# Patient Record
Sex: MALE | Race: WHITE | NOT HISPANIC OR LATINO | Employment: OTHER
[De-identification: names, ages, dates, MRNs, and addresses within clinical notes are randomized per-mention and may not be internally consistent; named-entity substitution may affect disease eponyms.]

---

## 2017-04-07 ENCOUNTER — RECORDS - HEALTHEAST (OUTPATIENT)
Dept: ADMINISTRATIVE | Facility: OTHER | Age: 22
End: 2017-04-07

## 2017-05-19 ENCOUNTER — RECORDS - HEALTHEAST (OUTPATIENT)
Dept: ADMINISTRATIVE | Facility: OTHER | Age: 22
End: 2017-05-19

## 2017-06-01 ENCOUNTER — RECORDS - HEALTHEAST (OUTPATIENT)
Dept: ADMINISTRATIVE | Facility: OTHER | Age: 22
End: 2017-06-01

## 2017-06-08 ENCOUNTER — RECORDS - HEALTHEAST (OUTPATIENT)
Dept: ADMINISTRATIVE | Facility: OTHER | Age: 22
End: 2017-06-08

## 2017-06-14 ENCOUNTER — RECORDS - HEALTHEAST (OUTPATIENT)
Dept: ADMINISTRATIVE | Facility: OTHER | Age: 22
End: 2017-06-14

## 2017-07-19 ENCOUNTER — RECORDS - HEALTHEAST (OUTPATIENT)
Dept: ADMINISTRATIVE | Facility: OTHER | Age: 22
End: 2017-07-19

## 2017-09-05 ENCOUNTER — COMMUNICATION - HEALTHEAST (OUTPATIENT)
Dept: INTERNAL MEDICINE | Facility: CLINIC | Age: 22
End: 2017-09-05

## 2017-09-18 ENCOUNTER — RECORDS - HEALTHEAST (OUTPATIENT)
Dept: ADMINISTRATIVE | Facility: OTHER | Age: 22
End: 2017-09-18

## 2017-10-18 ENCOUNTER — RECORDS - HEALTHEAST (OUTPATIENT)
Dept: ADMINISTRATIVE | Facility: OTHER | Age: 22
End: 2017-10-18

## 2018-04-04 ENCOUNTER — RECORDS - HEALTHEAST (OUTPATIENT)
Dept: ADMINISTRATIVE | Facility: OTHER | Age: 23
End: 2018-04-04

## 2018-07-18 ENCOUNTER — RECORDS - HEALTHEAST (OUTPATIENT)
Dept: ADMINISTRATIVE | Facility: OTHER | Age: 23
End: 2018-07-18

## 2019-04-11 ENCOUNTER — RECORDS - HEALTHEAST (OUTPATIENT)
Dept: ADMINISTRATIVE | Facility: OTHER | Age: 24
End: 2019-04-11

## 2020-06-09 ENCOUNTER — RECORDS - HEALTHEAST (OUTPATIENT)
Dept: ADMINISTRATIVE | Facility: OTHER | Age: 25
End: 2020-06-09

## 2021-05-25 ENCOUNTER — RECORDS - HEALTHEAST (OUTPATIENT)
Dept: ADMINISTRATIVE | Facility: CLINIC | Age: 26
End: 2021-05-25

## 2022-01-10 ENCOUNTER — TELEPHONE (OUTPATIENT)
Dept: UROLOGY | Facility: CLINIC | Age: 27
End: 2022-01-10
Payer: COMMERCIAL

## 2022-01-10 NOTE — TELEPHONE ENCOUNTER
[facility] Call Center    Phone Message    May a detailed message be left on voicemail: yes     Reason for Call: Chavo, father states that patient has autism.  Patient has a stent placed in his penis that needs surgery to be taken out and patient also has kidney stones.  Patient was being seen at [facility].  Chavo states that Dr. Casanova is also the urologist for his daughter, Ya Beasley, who just had surgery with Dr. Casanova and Chavo would like for him to care for his son also.  Please reach out to Chavo.    Action Taken: Message routed to:  [facility] ([facility] Urology    Travel Screening: Not Applicable

## 2022-01-11 ENCOUNTER — PATIENT OUTREACH (OUTPATIENT)
Dept: UROLOGY | Facility: CLINIC | Age: 27
End: 2022-01-11
Payer: COMMERCIAL

## 2022-01-11 NOTE — TELEPHONE ENCOUNTER
Chavo calls into clinic stating his son has kidney stones with ureteral stent in place. Since stent placement, dad states stent had to be replaced due to infection and delayed due to covid infection. Pt is set for definitive stone management through [facility] sometime next month. Pt dad asking if Dr. Moseley would take over renal stone cares moving forward.    Dr. Casanova consulted. Per Dr. Casanova, he would be okay with setting up a virtual consult for stone management, however due to volume of surgeries and limited OR space, it is very unlikely stone management would occur any sooner through our health care system. Dr. Casanova advises to proceed with plan already set in motion through .

## 2022-01-12 ENCOUNTER — PRE VISIT (OUTPATIENT)
Dept: UROLOGY | Facility: CLINIC | Age: 27
End: 2022-01-12
Payer: COMMERCIAL

## 2022-01-12 ENCOUNTER — PATIENT OUTREACH (OUTPATIENT)
Dept: UROLOGY | Facility: CLINIC | Age: 27
End: 2022-01-12
Payer: COMMERCIAL

## 2022-01-12 NOTE — TELEPHONE ENCOUNTER
Reason for visit: consult      Dx/Hx/Sx: kidney stones, wants to see Dr. Casanova for stone removal surgery     Records/imaging/labs/orders: images requested     At Rooming: video visit

## 2022-01-12 NOTE — TELEPHONE ENCOUNTER
Pt riki phoned with update, [facility] has changed networks in order to have better coverage for other health issuers, their urologist is no longer in network and his procedure has to be cancelled with them. Appt booked with Charlee Kirkpatrick this week for planning.

## 2022-01-12 NOTE — TELEPHONE ENCOUNTER
MEDICAL RECORDS REQUEST   [facility]  Urology Clinic  [address] [place] [address]  PHONE: [phone]  Fax: [phone]        FUTURE VISIT INFORMATION                                                   Ramy Beasley, : 1995 scheduled for future visit at [facility]    APPOINTMENT INFORMATION:    Date: 2022    Provider:  Charlee Kirkpatrick CNP    Reason for Visit/Diagnosis: stone consult    REFERRAL INFORMATION:    Referring provider:  N/A    Specialty: N/A    Referring providers clinic:  N/A    Clinic contact number:  n/a    RECORDS REQUESTED FOR VISIT                                                     NOTES  STATUS/DETAILS   OFFICE NOTE from referring provider  no   OFFICE NOTE from other specialist  yes, 2021 -- Alondra Del Real APRN, CNP   DISCHARGE SUMMARY from hospital  yes, 2021 -- [facility]   DISCHARGE REPORT from the ER  yes, 2021, 2021   OPERATIVE REPORT  yes   MEDICATION LIST  yes   LABS     URINALYSIS (UA)  yes   URINE CYTOLOGY  no   KIDNEY STONE     CT Chest ABD Pelvis  yes, 2021   IMAGING (IMAGES & REPORT)  yes, 2021, 2021, 2021, 10/30/2019 -- CT ABD Pelvis  2021 -- CT ABD Pelvis Stone   KUB  yes, 2021 -- XR Retrograde Pyelogram KUB  10/30/2019 -- XR ABD KUB     PRE-VISIT CHECKLIST      Record collection complete yes   Appointment appropriately scheduled           (right time/right provider) Yes   Joint diagnostic appointment coordinated correctly          (ensure right order & amount of time) Yes   MyChart activation No   Questionnaire complete If no, please explain pending     Action 2022 JTV 2:47pm   Action Taken [facility] sent a request to [employer] for images to be sent.     Action 2022 JTV 9:24am   Action Taken [facility] sent a request to [employer] for images to be pushed.     @12:31pm, [facility] received and resolved images from .

## 2022-01-14 ENCOUNTER — TELEPHONE (OUTPATIENT)
Dept: UROLOGY | Facility: CLINIC | Age: 27
End: 2022-01-14

## 2022-01-14 ENCOUNTER — VIRTUAL VISIT (OUTPATIENT)
Dept: UROLOGY | Facility: CLINIC | Age: 27
End: 2022-01-14
Payer: COMMERCIAL

## 2022-01-14 ENCOUNTER — PATIENT OUTREACH (OUTPATIENT)
Dept: UROLOGY | Facility: CLINIC | Age: 27
End: 2022-01-14

## 2022-01-14 DIAGNOSIS — N20.1 URETERAL STONE: Primary | ICD-10-CM

## 2022-01-14 DIAGNOSIS — N32.89 BLADDER SPASMS: ICD-10-CM

## 2022-01-14 PROCEDURE — 99203 OFFICE O/P NEW LOW 30 MIN: CPT | Mod: GT | Performed by: NURSE PRACTITIONER

## 2022-01-14 RX ORDER — CETIRIZINE HYDROCHLORIDE 10 MG/1
10 TABLET ORAL
COMMUNITY
End: 2022-03-01

## 2022-01-14 RX ORDER — AMOXICILLIN 250 MG
1 CAPSULE ORAL DAILY
COMMUNITY

## 2022-01-14 RX ORDER — RISPERIDONE 3 MG/1
3 TABLET ORAL
COMMUNITY
Start: 2021-07-27 | End: 2022-03-07

## 2022-01-14 RX ORDER — OXYBUTYNIN CHLORIDE 5 MG/1
5 TABLET ORAL 3 TIMES DAILY PRN
Qty: 60 TABLET | Refills: 1 | Status: SHIPPED | OUTPATIENT
Start: 2022-01-14 | End: 2022-03-01

## 2022-01-14 RX ORDER — MULTIVITAMIN
1 TABLET ORAL DAILY
COMMUNITY

## 2022-01-14 RX ORDER — LORATADINE 10 MG/1
10 TABLET ORAL
COMMUNITY
End: 2024-02-05 | Stop reason: ALTCHOICE

## 2022-01-14 RX ORDER — QUETIAPINE FUMARATE 300 MG/1
300 TABLET, FILM COATED ORAL 2 TIMES DAILY
COMMUNITY
Start: 2021-07-27

## 2022-01-14 NOTE — PROGRESS NOTES
Ramy is a 26 year old who is being evaluated via a billable video visit.      How would you like to obtain your AVS? Mail a copy  If the video visit is dropped, the invitation should be resent by: Send to e-mail at: [email or web address]  Will anyone else be joining your video visit? No    Video Start Time: 7:00 AM  Video-Visit Details    Type of service:  Video Visit    Video End Time: 7:18 AM    Originating Location (pt. Location): Home    Distant Location (provider location):  [facility] [place]     Platform used for Video Visit: Mandie       Ramy NAM Gale complains of   Chief Complaint   Patient presents with     Consult     stone removal       Assessment/Plan:  26 year old male with a complex PMH, including epilepsy, active autistic disorder, acute lymphoblastic leukemia in remission, mild intellectual disability, hyperlipidemia and autonomic neuropathy who was found to have a 6 mm stone at his left UPJ, now s/p stent placement by outside urologist. Due to changes to his insurance coverage needs to pursue definitive stone surgery at [facility]. We discussed ureteroscopy today in detail.   -Continue oxybutynin 5 mg TID as needed for bladder spasms related to stent. Refills sent.   -Will schedule a nurse visit in our clinic to obtain a catheterized urine culture, as the patient is incontinent at baseline.  -Patient has visit with his PCP in two weeks to establish care.   -Surgery case requested.     Charlee Kirkpatrick, CNP  Department of Urology      Subjective:   26 year old male with a history of epilepsy, active autistic disorder, acute lymphoblastic leukemia in remission, mild intellectual disability, hyperlipidemia and autonomic neuropathy who presents via video visit and is joined by his parents, Chavo and Trevor, who provide his HPI information. The patient is nonverbal. On 11/8/21, he was found to have a 6 mm stone at the left UPJ with moderate hydro, per CT. Stent placed and surgery was  planned with [facility] urologist. However, the patient developed COVID pneumonia a few weeks later and surgery was delayed. His stent was exchanged on 12/20/21.     This is his first stone. The patient does take Topamax and a taper plan has already been discussed. Mildred notes that they will begin this taper after his current stone has been dealt with and he has some time to recover from the past few months.      He has been taking oxybutynin 5 mg TID as needed for bladder spasms with his stent in place. As he is nonverbal, he cannot communicate if he is experiencing pain, but sometimes makes a face that suggests discomfort. They would like to know if he should continue this medication, and if so, request a refill.     He has a visit to establish care with his new PCP in two weeks, Dr. Ferrer at the [facility].       Objective:     Exam:   Patient is a 26 year old male   No vital signs obtained due to virtual visit.  General Appearance: Well groomed, hygenic  Respiratory: No cough, no respiratory distress or labored breathing  Skin: No discoloration or apparent rashes  Psychiatric: Alert, nonverbal  Further examination is deferred due to the nature of our visit.

## 2022-01-14 NOTE — TELEPHONE ENCOUNTER
FUTURE VISIT INFORMATION      SURGERY INFORMATION:    Date: 2022    Location: [facility] OR    Surgeon:  Nakul Casanova MD    Anesthesia Type:  General    Procedure: CYSTOURETEROSCOPY, WITH LITHOTRIPSY USING LASER AND URETERAL STENT INSERTION    Consult: 22    RECORDS REQUESTED FROM:       Primary Care Provider: Yousuf Maria DO ([facility] [[email or web address]  )    Pertinent Medical History: Acute lymphoblastic leukemia (ALL) ([facility])    Most recent EKG+ Tracin22 ([facility])

## 2022-01-14 NOTE — TELEPHONE ENCOUNTER
Urology provider requests updated urine sample prior to surgery, will potentially need to be a catheter sample due to urinary incontinence. Detailed voicemail left for dad asking return call to arrange      DEV Powell

## 2022-01-14 NOTE — LETTER
1/14/2022       RE: Ramy Beasley  [address] [address]     Dear Colleague,    Thank you for referring your patient, Ramy Beasley, to the [facility] [place] at [facility]. Please see a copy of my visit note below.    Ramy is a 26 year old who is being evaluated via a billable video visit.      How would you like to obtain your AVS? Mail a copy  If the video visit is dropped, the invitation should be resent by: Send to e-mail at: [email or web address]  Will anyone else be joining your video visit? No    Video Start Time: 7:00 AM  Video-Visit Details    Type of service:  Video Visit    Video End Time: 7:18 AM    Originating Location (pt. Location): Home    Distant Location (provider location):  [facility] [place]     Platform used for Video Visit: AmWell       Ramy Beasley complains of   Chief Complaint   Patient presents with     Consult     stone removal       Assessment/Plan:  26 year old male with a complex PMH, including epilepsy, active autistic disorder, acute lymphoblastic leukemia in remission, mild intellectual disability, hyperlipidemia and autonomic neuropathy who was found to have a 6 mm stone at his left UPJ, now s/p stent placement by outside urologist. Due to changes to his insurance coverage needs to pursue definitive stone surgery at [facility]. We discussed ureteroscopy today in detail.   -Continue oxybutynin 5 mg TID as needed for bladder spasms related to stent. Refills sent.   -Will schedule a nurse visit in our clinic to obtain a catheterized urine culture, as the patient is incontinent at baseline.  -Patient has visit with his PCP in two weeks to establish care.   -Surgery case requested.     Charlee Kirkpatrick, CNP  Department of Urology      Subjective:   26 year old male with a history of epilepsy, active autistic disorder, acute lymphoblastic leukemia in remission, mild  intellectual disability, hyperlipidemia and autonomic neuropathy who presents via video visit and is joined by his parents, Chavo and Mildred, who provide his HPI information. The patient is nonverbal. On 11/8/21, he was found to have a 6 mm stone at the left UPJ with moderate hydro, per CT. Stent placed and surgery was planned with [facility] urologist. However, the patient developed COVID pneumonia a few weeks later and surgery was delayed. His stent was exchanged on 12/20/21.     This is his first stone. The patient does take Topamax and a taper plan has already been discussed. Mildred notes that they will begin this taper after his current stone has been dealt with and he has some time to recover from the past few months.      He has been taking oxybutynin 5 mg TID as needed for bladder spasms with his stent in place. As he is nonverbal, he cannot communicate if he is experiencing pain, but sometimes makes a face that suggests discomfort. They would like to know if he should continue this medication, and if so, request a refill.     He has a visit to establish care with his new PCP in two weeks, Dr. Ferrer at the [facility].       Objective:     Exam:   Patient is a 26 year old male   No vital signs obtained due to virtual visit.  General Appearance: Well groomed, hygenic  Respiratory: No cough, no respiratory distress or labored breathing  Skin: No discoloration or apparent rashes  Psychiatric: Alert, nonverbal  Further examination is deferred due to the nature of our visit.                 Again, thank you for allowing me to participate in the care of your patient.      Sincerely,    Charlee Kirkpatrick, CNP

## 2022-01-14 NOTE — PATIENT INSTRUCTIONS
UROLOGY CLINIC VISIT PATIENT INSTRUCTIONS    -Please follow up for a nurse visit to obtain a catheterized urine sample.   -Follow up for surgery per surgery scheduler.     If you have any issues, questions or concerns in the meantime, do not hesitate to contact us at [phone] or via [employer].       Charlee Kirkpatrick, CNP  Department of Urology

## 2022-01-14 NOTE — H&P (VIEW-ONLY)
Ramy is a 26 year old who is being evaluated via a billable video visit.      How would you like to obtain your AVS? Mail a copy  If the video visit is dropped, the invitation should be resent by: Send to e-mail at: [email or web address]  Will anyone else be joining your video visit? No    Video Start Time: 7:00 AM  Video-Visit Details    Type of service:  Video Visit    Video End Time: 7:18 AM    Originating Location (pt. Location): Home    Distant Location (provider location):  [facility] [place]     Platform used for Video Visit: Mandie       Ramy NAM Gale complains of   Chief Complaint   Patient presents with     Consult     stone removal       Assessment/Plan:  26 year old male with a complex PMH, including epilepsy, active autistic disorder, acute lymphoblastic leukemia in remission, mild intellectual disability, hyperlipidemia and autonomic neuropathy who was found to have a 6 mm stone at his left UPJ, now s/p stent placement by outside urologist. Due to changes to his insurance coverage needs to pursue definitive stone surgery at [facility]. We discussed ureteroscopy today in detail.   -Continue oxybutynin 5 mg TID as needed for bladder spasms related to stent. Refills sent.   -Will schedule a nurse visit in our clinic to obtain a catheterized urine culture, as the patient is incontinent at baseline.  -Patient has visit with his PCP in two weeks to establish care.   -Surgery case requested.     Charlee Kirkpatrick, CNP  Department of Urology      Subjective:   26 year old male with a history of epilepsy, active autistic disorder, acute lymphoblastic leukemia in remission, mild intellectual disability, hyperlipidemia and autonomic neuropathy who presents via video visit and is joined by his parents, Chavo and Trevor, who provide his HPI information. The patient is nonverbal. On 11/8/21, he was found to have a 6 mm stone at the left UPJ with moderate hydro, per CT. Stent placed and surgery was  planned with [facility] urologist. However, the patient developed COVID pneumonia a few weeks later and surgery was delayed. His stent was exchanged on 12/20/21.     This is his first stone. The patient does take Topamax and a taper plan has already been discussed. Mildred notes that they will begin this taper after his current stone has been dealt with and he has some time to recover from the past few months.      He has been taking oxybutynin 5 mg TID as needed for bladder spasms with his stent in place. As he is nonverbal, he cannot communicate if he is experiencing pain, but sometimes makes a face that suggests discomfort. They would like to know if he should continue this medication, and if so, request a refill.     He has a visit to establish care with his new PCP in two weeks, Dr. Ferrer at the [facility].       Objective:     Exam:   Patient is a 26 year old male   No vital signs obtained due to virtual visit.  General Appearance: Well groomed, hygenic  Respiratory: No cough, no respiratory distress or labored breathing  Skin: No discoloration or apparent rashes  Psychiatric: Alert, nonverbal  Further examination is deferred due to the nature of our visit.

## 2022-01-17 DIAGNOSIS — Z11.59 ENCOUNTER FOR SCREENING FOR OTHER VIRAL DISEASES: Primary | ICD-10-CM

## 2022-01-18 ENCOUNTER — OFFICE VISIT (OUTPATIENT)
Dept: UROLOGY | Facility: CLINIC | Age: 27
End: 2022-01-18
Payer: COMMERCIAL

## 2022-01-18 ENCOUNTER — PATIENT OUTREACH (OUTPATIENT)
Dept: UROLOGY | Facility: CLINIC | Age: 27
End: 2022-01-18
Payer: COMMERCIAL

## 2022-01-18 DIAGNOSIS — N20.1 URETERAL STONE: Primary | ICD-10-CM

## 2022-01-18 LAB
ALBUMIN UR-MCNC: 100 MG/DL
AMORPH CRY #/AREA URNS HPF: ABNORMAL /HPF
APPEARANCE UR: ABNORMAL
BILIRUB UR QL STRIP: NEGATIVE
COLOR UR AUTO: YELLOW
GLUCOSE UR STRIP-MCNC: NEGATIVE MG/DL
HGB UR QL STRIP: NEGATIVE
KETONES UR STRIP-MCNC: NEGATIVE MG/DL
LEUKOCYTE ESTERASE UR QL STRIP: ABNORMAL
MUCOUS THREADS #/AREA URNS LPF: PRESENT /LPF
NITRATE UR QL: NEGATIVE
PH UR STRIP: 6 [PH] (ref 5–7)
RBC URINE: 22 /HPF
SP GR UR STRIP: 1.02 (ref 1–1.03)
TRANSITIONAL EPI: <1 /HPF
UROBILINOGEN UR STRIP-MCNC: NORMAL MG/DL
WBC URINE: 26 /HPF

## 2022-01-18 PROCEDURE — 81001 URINALYSIS AUTO W/SCOPE: CPT | Performed by: PATHOLOGY

## 2022-01-18 PROCEDURE — 87086 URINE CULTURE/COLONY COUNT: CPT | Mod: 90 | Performed by: PATHOLOGY

## 2022-01-18 PROCEDURE — 99000 SPECIMEN HANDLING OFFICE-LAB: CPT | Performed by: PATHOLOGY

## 2022-01-18 NOTE — CONFIDENTIAL NOTE
Patient is scheduled for surgery with Dr. Casanova    Spoke with: Patient's father Chavo    Date of Surgery: Wednesday 01/26/22    Location: [facility] OR     Informed patient they will need an adult  Yes    Pre op with Provider brandon    H&P: Scheduled with PAC video visit Thursday 01/20/22    Pre-procedure COVID-19 Test: Patient tested positive for covid on 11/24/22    Additional imaging/appointments: na    Surgery packet: optifrieght to patient 01/17/22, verified address on file is current and correct.      Additional comments: ok for [facility] per Dhruv Moy [facility] CRNA 01/14/22

## 2022-01-18 NOTE — TELEPHONE ENCOUNTER
Pre Op Teaching Flowsheet       Pre and Post op Patient Education  Relevant Diagnosis:  stone  Surgical procedure:  Laser litho  Teaching Topic:  Pre and post op teaching  Person Involved in teaching: Yes    Motivation Level:  Asks Questions: Yes  Eager to Learn: Yes  Cooperative: Yes  Receptive (willing/able to accept information):  Yes    Patient demonstrates understanding of the following:  Date of surgery:  1/26  Location of surgery:  [facility] - 5th Floor  History and Physical and any other testing necessary prior to surgery: Yes  Required time line for completion of History and Physical and any pre-op testing: Yes    Patient demonstrates understanding of the following:  Pre-op bowel prep:  N/A  Pre-op showering/scrub information with PCMX Soap: Yes  Blood thinner medications discussed and when to stop (if applicable):  Yes  Discussed no visitor's at this time due to increase Covid-19 cases and how we need to make sure everyone stays safe.    Infection Prevention:   Patient demonstrates understanding of the following:  Surgical procedure site care taught: Yes  Signs and symptoms of infection taught: Yes      Post-op follow-up:  Discussed how to contact the hospital, nurse, and clinic scheduling staff if necessary. (See packet information)    Instructional materials used/given/mailed:  [place] Surgery Packet, post op teaching sheet, Map, Soap, and with the arrival/location information to come closer to the surgery date.    Surgical instructions packet given to patient in office:  N/A    Follow up: Discussed arranging for someone to drive you home. ( No public transportation)  Someone needed to stay the first twenty hours after surgery: Yes     referral: no     home:  yes    Care Giver:  yes    PCP:  yes

## 2022-01-19 ENCOUNTER — NURSE TRIAGE (OUTPATIENT)
Dept: NURSING | Facility: CLINIC | Age: 27
End: 2022-01-19

## 2022-01-19 NOTE — TELEPHONE ENCOUNTER
"Caller is pt's father (Chavo).  Father reports being pt's father as well as guardian.  Pt is autistic with multiple other co-morbidities.    Had covid during November 2021.  \"Hospitalized twice over the past month ([facility]) for lethargy resulting from covid.\"  Pt is considered a covid \"long-hauler.\"  Multiple co-morbidities -> Epilepsy, kidney stones, anemia, necrosis of joints.    Last evening \"Took Ramy to [facility] for dehydration\".  \"He sleeps 20 hours a day.\"  \"He was given a liter of fluids and then was 'happier'\"  No fevers or other vital sign changes.    Today \"lethargic again.\"  \"Did have a wet diaper this morning upon waking -> 7 hours ago.\"    Parents \"need help at home.\"  \"Hematologist refused to order home care.\"  Parents are seeking home care orders.  Father states \"We can't just keep taking him to ERs all the time.\"    Conducted thorough triage now to assess pt's stability due to father's report of past 'dehydration.'  Pt is now able to be awakened, exhibits alertness and is now drinking Pedialyte per father's report.  Currently stable.  Appt currently scheduled for next week to establish care.  However father and mother request clinical eval sooner to address the issue of pursuing home care assistance.  Transferred to a  for appointment.    [facility] Nurse Advisor     Reason for Disposition    Patient wants to be seen     (parents and guardian)    Additional Information    Negative: Difficult to awaken or acting confused (e.g., disoriented, slurred speech)    Negative: New neurologic deficit that is present NOW, sudden onset of ANY of the following: * Weakness of the face, arm, or leg on one side of the body* Numbness of the face, arm, or leg on one side of the body* Loss of speech or garbled speech    Negative: Sounds like a life-threatening emergency to the triager    Negative: Confusion, disorientation, or hallucinations is the main symptom    Negative: " Dizziness is the main symptom    Negative: Followed a head injury within last 3 days    Negative: Headache (with neurologic deficit)    Negative: Unable to urinate (or only a few drops) and bladder feels very full    Negative: Loss of control of bowel or bladder (i.e., incontinence) of new onset    Negative: Back pain with numbness (loss of sensation) in groin or rectal area    Negative: Patient sounds very sick or weak to the triager    Negative: Neurologic deficit that was brief (now gone), ANY of the following: * Weakness of the face, arm, or leg on one side of the body * Numbness of the face, arm, or leg on one side of the body * Loss of speech or garbled speech    Negative: Neurologic deficit of gradual onset, ANY of the following: * Weakness of the face, arm, or leg on one side of the body * Numbness of the face, arm, or leg on one side of the body * Loss of speech or garbled speech    Negative: [place] palsy suspected (i.e., weakness only one side of the face, developing over hours to days, no other symptoms)    Negative: Tingling (e.g., pins and needles) of the face, arm or leg on one side of the body, that is  present now (Exceptions: chronic/recurrent symptom lasting > 4 weeks or tingling from known cause, such as: bumped elbow, carpal tunnel syndrome, pinched nerve, frostbite)    Negative: Neck pain (with neurologic deficit)    Negative: Back pain (with neurologic deficit)    Protocols used: NEUROLOGIC DEFICIT-A-OH    __________________    COVID 19 Nurse Triage Plan/Patient Instructions    Please be aware that novel coronavirus (COVID-19) may be circulating in the community. If you develop symptoms such as fever, cough, or SOB or if you have concerns about the presence of another infection including coronavirus (COVID-19), please contact your health care provider or visit [email or web address].     Disposition/Instructions    Additional COVID19 information to add for patients.   How can I protect  "others?  If you have symptoms (fever, cough, body aches or trouble breathing): Stay home and away from others (self-isolate) until:    At least 10 days have passed since your symptoms started, And     You ve had no fever--and no medicine that reduces fever--for 1 full day (24 hours), And      Your other symptoms have resolved (gotten better).     If you don t have symptoms, but a test showed that you have COVID-19 (you tested positive):    Stay home and away from others (self-isolate). Follow the tips under \"How do I self-isolate?\" below for 10 days (20 days if you have a weak immune system).    You don't need to be retested for COVID-19 before going back to school or work. As long as you're fever-free and feeling better, you can go back to school, work and other activities after waiting the 10 or 20 days.     How do I self-isolate?    Stay in your own room, even for meals. Use your own bathroom if you can.     Stay away from others in your home. No hugging, kissing or shaking hands. No visitors.    Don t go to work, school or anywhere else.     Clean  high touch  surfaces often (doorknobs, counters, handles, etc.). Use a household cleaning spray or wipes. You ll find a full list on the EPA website:  www.epa.gov/pesticide-registration/list-n-disinfectants-use-against-sars-cov-2.    Cover your mouth and nose with a mask, tissue or washcloth to avoid spreading germs.    Wash your hands and face often. Use soap and water.    Caregivers in these groups are at risk for severe illness due to COVID-19:  o People 65 years and older  o People who live in a nursing home or long-term care facility  o People with chronic disease (lung, heart, cancer, diabetes, kidney, liver, immunologic)  o People who have a weakened immune system, including those who:  - Are in cancer treatment  - Take medicine that weakens the immune system, such as corticosteroids  - Had a bone marrow or organ transplant  - Have an immune deficiency  - Have " poorly controlled HIV or AIDS  - Are obese (body mass index of 40 or higher)  - Smoke regularly    Caregivers should wear gloves while washing dishes, handling laundry and cleaning bedrooms and bathrooms.    Use caution when washing and drying laundry: Don t shake dirty laundry, and use the warmest water setting that you can.    For more tips, go to www.cdc.gov/coronavirus/2019-ncov/downloads/10Things.pdf.    How can I take care of myself?  1. Get lots of rest. Drink extra fluids (unless a doctor has told you not to).     2. Take Tylenol (acetaminophen) for fever or pain. If you have liver or kidney problems, ask your family doctor if it s okay to take Tylenol.     Adults can take either:     650 mg (two 325 mg pills) every 4 to 6 hours, or     1,000 mg (two 500 mg pills) every 8 hours as needed.     Note: Don t take more than 3,000 mg in one day.   Acetaminophen is found in many medicines (both prescribed and over-the-counter medicines). Read all labels to be sure you don t take too much.     For children, check the Tylenol bottle for the right dose. The dose is based on the child s age or weight.    3. If you have other health problems (like cancer, heart failure, an organ transplant or severe kidney disease): Call your specialty clinic if you don t feel better in the next 2 days.    4. Know when to call 911: Emergency warning signs include:    Trouble breathing or shortness of breath    Pain or pressure in the chest that doesn t go away    Feeling confused like you haven t felt before, or not being able to wake up    Bluish-colored lips or face    What are the symptoms of COVID-19?     The most common symptoms are cough, fever and trouble breathing.     Less common symptoms include body aches, chills, diarrhea (loose, watery poops), fatigue (feeling very tired), headache, runny nose, sore throat and loss of smell.    COVID-19 can cause severe coughing (bronchitis) and lung infection (pneumonia).    How does it  spread?     The virus may spread when a person coughs or sneezes into the air. The virus can travel about 6 feet this way, and it can live on surfaces.      Common  (household disinfectants) will kill the virus.    Who is at risk?  Anyone can catch COVID-19 if they re around someone who has the virus.    How can others protect themselves?     Stay away from people who have COVID-19 (or symptoms of COVID-19).    Wash hands often with soap and water. Or, use hand  with at least 60% alcohol.    Avoid touching the eyes, nose or mouth.     Wear a face mask when you go out in public, when sick or when caring for a sick person.    Where can I get more information?     "[employer] [place]: About COVID-19: [email or web address]    CDC: What to Do If You re Sick: www.cdc.gov/coronavirus/2019-ncov/about/steps-when-sick.html    CDC: Ending Home Isolation: www.cdc.gov/coronavirus/2019-ncov/hcp/disposition-in-home-patients.html     CDC: Caring for Someone: www.cdc.gov/coronavirus/2019-ncov/if-you-are-sick/care-for-someone.html     [facility]: Interim Guidance for Hospital Discharge to Home: www.health.[facility]/diseases/coronavirus/hcp/hospdischarge.pdf    [facility] clinical trials (COVID-19 research studies): clinicalaffairs.[facility]-clinical-trials     Below are the COVID-19 hotlines at the [place] Department of Health ([facility]). Interpreters are available.   o For health questions: Call 081-326-4320 or 1-625.622.8123 (7 a.m. to 7 p.m.)  o For questions about schools and childcare: Call 317-020-6789 or 1-174.622.6684 (7 a.m. to 7 p.m.)          Thank you for taking steps to prevent the spread of this virus.  o Limit your contact with others.  o Wear a simple mask to cover your cough.  o Wash your hands well and often.    Resources    M "[employer] [place]: About COVID-19: [email or web address]    CDC: What to Do If You're Sick:  www.cdc.gov/coronavirus/2019-ncov/about/steps-when-sick.html    CDC: Ending Home Isolation: www.cdc.gov/coronavirus/2019-ncov/hcp/disposition-in-home-patients.html     CDC: Caring for Someone: www.cdc.gov/coronavirus/2019-ncov/if-you-are-sick/care-for-someone.html     [facility]: Interim Guidance for Hospital Discharge to Home: www.[facility].mn./diseases/coronavirus/hcp/hospdischarge.pdf    [facility] clinical trials (COVID-19 research studies): clinicalaffairs.[facility]-clinical-trials     Below are the COVID-19 hotlines at the [place] Department of Health ([facility]). Interpreters are available.   o For health questions: Call 647-669-9300 or 1-468.602.6338 (7 a.m. to 7 p.m.)  o For questions about schools and childcare: Call 394-071-4521 or 1-145.877.5211 (7 a.m. to 7 p.m.)

## 2022-01-20 ENCOUNTER — PRE VISIT (OUTPATIENT)
Dept: SURGERY | Facility: CLINIC | Age: 27
End: 2022-01-20

## 2022-01-20 LAB — BACTERIA UR CULT: NO GROWTH

## 2022-01-21 ENCOUNTER — PATIENT OUTREACH (OUTPATIENT)
Dept: UROLOGY | Facility: CLINIC | Age: 27
End: 2022-01-21
Payer: COMMERCIAL

## 2022-01-21 NOTE — TELEPHONE ENCOUNTER
Pt is having g-tube placed today and dad is not sure about discharge date. Pt missed pre op with ED event, and we may be able to manage a covid test before discharge or stat order Monday, depending on timing of everything. Will wait for updates.    Pt currently at [facility]

## 2022-01-24 ENCOUNTER — PATIENT OUTREACH (OUTPATIENT)
Dept: UROLOGY | Facility: CLINIC | Age: 27
End: 2022-01-24
Payer: COMMERCIAL

## 2022-01-24 NOTE — TELEPHONE ENCOUNTER
Pt dad called and team updated- will proceed as planned on Wednesday, pt is discharging today and having covid test prior. Surgery team updated.

## 2022-01-24 NOTE — TELEPHONE ENCOUNTER
Pt was advised to have covid test done, inaccurately by urology nurse. Pt did have covid within last 90 days-should not have retested. Pt test result from 1/24 is null and okay to proceed per chris at [facility] per protocol. Staff updated.

## 2022-01-25 ENCOUNTER — ANESTHESIA EVENT (OUTPATIENT)
Dept: SURGERY | Facility: AMBULATORY SURGERY CENTER | Age: 27
End: 2022-01-25
Payer: COMMERCIAL

## 2022-01-26 ENCOUNTER — ANCILLARY PROCEDURE (OUTPATIENT)
Dept: RADIOLOGY | Facility: AMBULATORY SURGERY CENTER | Age: 27
End: 2022-01-26
Attending: UROLOGY
Payer: COMMERCIAL

## 2022-01-26 ENCOUNTER — ANESTHESIA (OUTPATIENT)
Dept: SURGERY | Facility: AMBULATORY SURGERY CENTER | Age: 27
End: 2022-01-26
Payer: COMMERCIAL

## 2022-01-26 ENCOUNTER — HOSPITAL ENCOUNTER (OUTPATIENT)
Facility: AMBULATORY SURGERY CENTER | Age: 27
End: 2022-01-26
Attending: UROLOGY
Payer: COMMERCIAL

## 2022-01-26 VITALS
WEIGHT: 150 LBS | BODY MASS INDEX: 18.65 KG/M2 | DIASTOLIC BLOOD PRESSURE: 70 MMHG | HEIGHT: 75 IN | SYSTOLIC BLOOD PRESSURE: 112 MMHG | OXYGEN SATURATION: 98 % | RESPIRATION RATE: 10 BRPM | HEART RATE: 84 BPM | TEMPERATURE: 97 F

## 2022-01-26 DIAGNOSIS — N20.1 URETERAL STONE: ICD-10-CM

## 2022-01-26 PROCEDURE — 74420 UROGRAPHY RTRGR +-KUB: CPT | Mod: 26 | Performed by: UROLOGY

## 2022-01-26 PROCEDURE — 87070 CULTURE OTHR SPECIMN AEROBIC: CPT | Mod: 90 | Performed by: PATHOLOGY

## 2022-01-26 PROCEDURE — 74420 UROGRAPHY RTRGR +-KUB: CPT | Mod: TC

## 2022-01-26 PROCEDURE — 52356 CYSTO/URETERO W/LITHOTRIPSY: CPT | Mod: LT

## 2022-01-26 PROCEDURE — 82365 CALCULUS SPECTROSCOPY: CPT | Mod: 90 | Performed by: PATHOLOGY

## 2022-01-26 PROCEDURE — 87106 FUNGI IDENTIFICATION YEAST: CPT | Mod: 90 | Performed by: PATHOLOGY

## 2022-01-26 PROCEDURE — 52356 CYSTO/URETERO W/LITHOTRIPSY: CPT | Mod: LT | Performed by: UROLOGY

## 2022-01-26 PROCEDURE — 87205 SMEAR GRAM STAIN: CPT | Mod: 90 | Performed by: PATHOLOGY

## 2022-01-26 PROCEDURE — 99000 SPECIMEN HANDLING OFFICE-LAB: CPT | Performed by: PATHOLOGY

## 2022-01-26 DEVICE — IMPLANTABLE DEVICE: Type: IMPLANTABLE DEVICE | Site: URETER | Status: FUNCTIONAL

## 2022-01-26 RX ORDER — GABAPENTIN 300 MG/1
300 CAPSULE ORAL
Status: DISCONTINUED | OUTPATIENT
Start: 2022-01-26 | End: 2022-01-26 | Stop reason: HOSPADM

## 2022-01-26 RX ORDER — LIDOCAINE HYDROCHLORIDE 20 MG/ML
INJECTION, SOLUTION INFILTRATION; PERINEURAL PRN
Status: DISCONTINUED | OUTPATIENT
Start: 2022-01-26 | End: 2022-01-26

## 2022-01-26 RX ORDER — HYDROMORPHONE HYDROCHLORIDE 1 MG/ML
0.2 INJECTION, SOLUTION INTRAMUSCULAR; INTRAVENOUS; SUBCUTANEOUS EVERY 5 MIN PRN
Status: DISCONTINUED | OUTPATIENT
Start: 2022-01-26 | End: 2022-01-26 | Stop reason: HOSPADM

## 2022-01-26 RX ORDER — SODIUM CHLORIDE, SODIUM LACTATE, POTASSIUM CHLORIDE, CALCIUM CHLORIDE 600; 310; 30; 20 MG/100ML; MG/100ML; MG/100ML; MG/100ML
INJECTION, SOLUTION INTRAVENOUS CONTINUOUS
Status: DISCONTINUED | OUTPATIENT
Start: 2022-01-26 | End: 2022-01-27 | Stop reason: HOSPADM

## 2022-01-26 RX ORDER — DEXAMETHASONE SODIUM PHOSPHATE 4 MG/ML
INJECTION, SOLUTION INTRA-ARTICULAR; INTRALESIONAL; INTRAMUSCULAR; INTRAVENOUS; SOFT TISSUE PRN
Status: DISCONTINUED | OUTPATIENT
Start: 2022-01-26 | End: 2022-01-26

## 2022-01-26 RX ORDER — ONDANSETRON 2 MG/ML
INJECTION INTRAMUSCULAR; INTRAVENOUS PRN
Status: DISCONTINUED | OUTPATIENT
Start: 2022-01-26 | End: 2022-01-26

## 2022-01-26 RX ORDER — CEFTRIAXONE 1 G/1
1 INJECTION, POWDER, FOR SOLUTION INTRAMUSCULAR; INTRAVENOUS EVERY 24 HOURS
Status: DISCONTINUED | OUTPATIENT
Start: 2022-01-26 | End: 2022-01-26 | Stop reason: HOSPADM

## 2022-01-26 RX ORDER — ONDANSETRON 2 MG/ML
4 INJECTION INTRAMUSCULAR; INTRAVENOUS EVERY 30 MIN PRN
Status: DISCONTINUED | OUTPATIENT
Start: 2022-01-26 | End: 2022-01-27 | Stop reason: HOSPADM

## 2022-01-26 RX ORDER — FENTANYL CITRATE 50 UG/ML
INJECTION, SOLUTION INTRAMUSCULAR; INTRAVENOUS PRN
Status: DISCONTINUED | OUTPATIENT
Start: 2022-01-26 | End: 2022-01-26

## 2022-01-26 RX ORDER — SODIUM CHLORIDE, SODIUM LACTATE, POTASSIUM CHLORIDE, CALCIUM CHLORIDE 600; 310; 30; 20 MG/100ML; MG/100ML; MG/100ML; MG/100ML
INJECTION, SOLUTION INTRAVENOUS CONTINUOUS
Status: DISCONTINUED | OUTPATIENT
Start: 2022-01-26 | End: 2022-01-26 | Stop reason: HOSPADM

## 2022-01-26 RX ORDER — PROPOFOL 10 MG/ML
INJECTION, EMULSION INTRAVENOUS PRN
Status: DISCONTINUED | OUTPATIENT
Start: 2022-01-26 | End: 2022-01-26

## 2022-01-26 RX ORDER — FENTANYL CITRATE 50 UG/ML
25 INJECTION, SOLUTION INTRAMUSCULAR; INTRAVENOUS
Status: DISCONTINUED | OUTPATIENT
Start: 2022-01-26 | End: 2022-01-27 | Stop reason: HOSPADM

## 2022-01-26 RX ORDER — CEFDINIR 300 MG/1
300 CAPSULE ORAL 2 TIMES DAILY
Qty: 8 CAPSULE | Refills: 0 | Status: SHIPPED | OUTPATIENT
Start: 2022-01-26 | End: 2022-03-01

## 2022-01-26 RX ORDER — PROPOFOL 10 MG/ML
INJECTION, EMULSION INTRAVENOUS CONTINUOUS PRN
Status: DISCONTINUED | OUTPATIENT
Start: 2022-01-26 | End: 2022-01-26

## 2022-01-26 RX ORDER — ACETAMINOPHEN 325 MG/1
975 TABLET ORAL ONCE
Status: DISCONTINUED | OUTPATIENT
Start: 2022-01-26 | End: 2022-01-26 | Stop reason: HOSPADM

## 2022-01-26 RX ORDER — GLYCOPYRROLATE 0.2 MG/ML
INJECTION, SOLUTION INTRAMUSCULAR; INTRAVENOUS PRN
Status: DISCONTINUED | OUTPATIENT
Start: 2022-01-26 | End: 2022-01-26

## 2022-01-26 RX ORDER — FLUCONAZOLE 10 MG/ML
100 POWDER, FOR SUSPENSION ORAL DAILY
Qty: 40 ML | Refills: 0 | Status: SHIPPED | OUTPATIENT
Start: 2022-01-26 | End: 2022-01-30

## 2022-01-26 RX ORDER — MEPERIDINE HYDROCHLORIDE 25 MG/ML
12.5 INJECTION INTRAMUSCULAR; INTRAVENOUS; SUBCUTANEOUS
Status: DISCONTINUED | OUTPATIENT
Start: 2022-01-26 | End: 2022-01-27 | Stop reason: HOSPADM

## 2022-01-26 RX ORDER — FENTANYL CITRATE 50 UG/ML
25 INJECTION, SOLUTION INTRAMUSCULAR; INTRAVENOUS EVERY 5 MIN PRN
Status: DISCONTINUED | OUTPATIENT
Start: 2022-01-26 | End: 2022-01-26 | Stop reason: HOSPADM

## 2022-01-26 RX ORDER — FLUCONAZOLE 2 MG/ML
400 INJECTION, SOLUTION INTRAVENOUS EVERY 24 HOURS
Status: DISCONTINUED | OUTPATIENT
Start: 2022-01-26 | End: 2022-01-26 | Stop reason: HOSPADM

## 2022-01-26 RX ORDER — ONDANSETRON 4 MG/1
4 TABLET, ORALLY DISINTEGRATING ORAL EVERY 30 MIN PRN
Status: DISCONTINUED | OUTPATIENT
Start: 2022-01-26 | End: 2022-01-27 | Stop reason: HOSPADM

## 2022-01-26 RX ORDER — LIDOCAINE 40 MG/G
CREAM TOPICAL
Status: DISCONTINUED | OUTPATIENT
Start: 2022-01-26 | End: 2022-01-26 | Stop reason: HOSPADM

## 2022-01-26 RX ORDER — KETOROLAC TROMETHAMINE 30 MG/ML
INJECTION, SOLUTION INTRAMUSCULAR; INTRAVENOUS PRN
Status: DISCONTINUED | OUTPATIENT
Start: 2022-01-26 | End: 2022-01-26

## 2022-01-26 RX ORDER — OXYCODONE HYDROCHLORIDE 5 MG/1
5 TABLET ORAL EVERY 4 HOURS PRN
Status: DISCONTINUED | OUTPATIENT
Start: 2022-01-26 | End: 2022-01-27 | Stop reason: HOSPADM

## 2022-01-26 RX ADMIN — FENTANYL CITRATE 50 MCG: 50 INJECTION, SOLUTION INTRAMUSCULAR; INTRAVENOUS at 12:21

## 2022-01-26 RX ADMIN — PROPOFOL 200 MCG/KG/MIN: 10 INJECTION, EMULSION INTRAVENOUS at 11:30

## 2022-01-26 RX ADMIN — FENTANYL CITRATE 50 MCG: 50 INJECTION, SOLUTION INTRAMUSCULAR; INTRAVENOUS at 11:48

## 2022-01-26 RX ADMIN — GLYCOPYRROLATE 0.2 MG: 0.2 INJECTION, SOLUTION INTRAMUSCULAR; INTRAVENOUS at 11:35

## 2022-01-26 RX ADMIN — KETOROLAC TROMETHAMINE 30 MG: 30 INJECTION, SOLUTION INTRAMUSCULAR; INTRAVENOUS at 11:43

## 2022-01-26 RX ADMIN — LIDOCAINE HYDROCHLORIDE 100 MG: 20 INJECTION, SOLUTION INFILTRATION; PERINEURAL at 11:30

## 2022-01-26 RX ADMIN — SODIUM CHLORIDE, SODIUM LACTATE, POTASSIUM CHLORIDE, CALCIUM CHLORIDE: 600; 310; 30; 20 INJECTION, SOLUTION INTRAVENOUS at 10:25

## 2022-01-26 RX ADMIN — CEFTRIAXONE 1 G: 1 INJECTION, POWDER, FOR SOLUTION INTRAMUSCULAR; INTRAVENOUS at 11:47

## 2022-01-26 RX ADMIN — FLUCONAZOLE 200 MG: 2 INJECTION, SOLUTION INTRAVENOUS at 10:25

## 2022-01-26 RX ADMIN — ONDANSETRON 4 MG: 2 INJECTION INTRAMUSCULAR; INTRAVENOUS at 11:43

## 2022-01-26 RX ADMIN — PROPOFOL 200 MG: 10 INJECTION, EMULSION INTRAVENOUS at 11:30

## 2022-01-26 RX ADMIN — FLUCONAZOLE 200 MG: 2 INJECTION, SOLUTION INTRAVENOUS at 11:30

## 2022-01-26 RX ADMIN — DEXAMETHASONE SODIUM PHOSPHATE 4 MG: 4 INJECTION, SOLUTION INTRA-ARTICULAR; INTRALESIONAL; INTRAMUSCULAR; INTRAVENOUS; SOFT TISSUE at 11:34

## 2022-01-26 ASSESSMENT — MIFFLIN-ST. JEOR: SCORE: 1751.03

## 2022-01-26 ASSESSMENT — ENCOUNTER SYMPTOMS: SEIZURES: 1

## 2022-01-26 NOTE — ANESTHESIA PREPROCEDURE EVALUATION
Anesthesia Pre-Procedure Evaluation    Patient: Ramy Beasley   MRN: [id number] : 1995        Preoperative Diagnosis: Ureteral stone [N20.1]    Procedure : Procedure(s):  CYSTOURETEROSCOPY, WITH LITHOTRIPSY USING LASER AND URETERAL LEFT STENT INSERTION          Past Medical History:   Diagnosis Date     Acute lymphoblastic leukemia (ALL) (H)      Autistic Disorder      Epilepsy And Recurrent Seizures      Ureteral stone 2022      Past Surgical History:   Procedure Laterality Date     HC REMOVAL ADENOIDS,SECOND,<11 Y/O      Description: Adenoidectomy Secondary;  Proc Date: 2000;     HC REMOVE TONSILS/ADENOIDS,<11 Y/O      Description: Tonsillectomy With Adenoidectomy;  Proc Date: 2000;      Allergies   Allergen Reactions     Pegaspargase Unknown      Social History     Tobacco Use     Smoking status: Never Smoker     Smokeless tobacco: Never Used   Substance Use Topics     Alcohol use: No      Wt Readings from Last 1 Encounters:   08/10/16 81.1 kg (178 lb 11.2 oz) (80 %, Z= 0.85)*     * Growth percentiles are based on [facility] (Boys, 2-20 Years) data.        Anesthesia Evaluation   Pt has had prior anesthetic. Type: General and MAC.        ROS/MED HX  ENT/Pulmonary:  - neg pulmonary ROS     Neurologic: Comment:   - active autistic disorder (nonverbal). Can understand some parts of conversations.  - mild intellectual disability      (+) peripheral neuropathy, - autonomic . seizures, Developmental delay,     Cardiovascular:     (+) Dyslipidemia -----    METS/Exercise Tolerance:     Hematologic:  - neg hematologic  ROS     Musculoskeletal:  - neg musculoskeletal ROS     GI/Hepatic:  - neg GI/hepatic ROS     Renal/Genitourinary:  - neg Renal ROS     Endo:       Psychiatric/Substance Use:  - neg psychiatric ROS     Infectious Disease:       Malignancy: Comment: ALL  (+) Malignancy, History of Lymphoma/Leukemia.Lymph CA Remission status post.        Other:            Physical Exam    Airway       Comment: Unable to assess.          Respiratory Devices and Support         Dental           Cardiovascular   cardiovascular exam normal          Pulmonary   pulmonary exam normal            Other findings: Patient appears withdrawn and sleepy.     OUTSIDE LABS:  CBC: No results found for: WBC, HGB, HCT, PLT  BMP: No results found for: NA, POTASSIUM, CHLORIDE, CO2, BUN, CR, GLC  COAGS: No results found for: PTT, INR, FIBR  POC: No results found for: BGM, HCG, HCGS  HEPATIC: No results found for: ALBUMIN, PROTTOTAL, ALT, AST, GGT, ALKPHOS, BILITOTAL, BILIDIRECT, BANDAR  OTHER: No results found for: PH, LACT, A1C, GERARDO, PHOS, MAG, LIPASE, AMYLASE, TSH, T4, T3, CRP, SED    Anesthesia Plan    ASA Status:  3      Anesthesia Type: General.     - Airway: LMA   Induction: Propofol, Intravenous.   Maintenance: TIVA.        Consents            Postoperative Care    Pain management: Multi-modal analgesia.   PONV prophylaxis: Background Propofol Infusion, Ondansetron (or other 5HT-3)     Comments:                Lisandra Albert MD

## 2022-01-26 NOTE — OP NOTE
OPERATIVE REPORT    PREOPERATIVE DIAGNOSIS:  Left-sided ureteral stone    POSTOPERATIVE DIAGNOSIS:  Same    PROCEDURES PERFORMED:   1. Cystourethroscopy  2. Left ureteroscopy  3. Left  retrograde pyelogram with interpretation of intraoperative fluoroscopic imaging  4. Holmium laser lithotripsy with basket stone extraction  5. Left ureteral stent exchange    STAFF SURGEON: Nakul Casanova MD  RESIDENT(S): Alcides Holland MD  ANESTHESIA: General    ESTIMATED BLOOD LOSS: 1 cc  DRAINS/TUBES: 7 Vincentian x 26cm double-J ureteral stent    IV FLUIDS: Please see dictated anesthesia record  COMPLICATIONS: None.   SPECIMEN: Stones for analysis  SIGNIFICANT FINDINGS:   1. Patient is stone free in left upper tract  2. Proximal curl of the ureteral stent seen in the renal pelvis under fluoroscopy and distal curl seen in the bladder under direct vision.     BRIEF OPERATIVE INDICATIONS: Ramy Beasley is a(n) 27 year old male who presented to an outside hospital with a 6mm left UPJ stone causing obstruction. He underwent left ureteral stent placement, and was sent to our facility for definitive stone management.  After a discussion of all risks, benefits, and alternatives, the patient and caregiver elected to proceed with definitive stone management. The patient understands the potential need for more than one procedure to eliminate all stone burden.     A 22-Vincentian rigid cystoscope was inserted into a well-lubricated urethra. The urethra was unremarkable without stricture. The previous indwelling ureteral stent was identified and removed with the flexible stent grasper to the level of the urethral meatus. It had a mucoid covering on the distal curl. A sensor wire was advanced up to the renal pelvis under fluoroscopic guidance and previous stent discarded.     Flexible URS  A dual lumen catheter was used to establish access with a second  wire. The previous sensor wire was then clamped to the drape to act as our safety wire. A  retrograde pyelogram was performed to serve as a roadmap. A 36 cm 12/14 ureteral access sheath was advanced up to the distal ureter. The flexible ureteroscope was used to identify the stone(s). A 200 micron laser fiber was used at a setting of 0.6 J and 6 Hz and lithotripsy was performed. A Halo basket was used to remove all fragments greater than 1 mm.  Pullback ureteroscopy was performed and showed no retained stone fragments or significant ureteral injury    A 7Fr x 26-cm double-J stent was advanced over the Sensor wire, and a good proximal curl was seen in the renal pelvis on fluoroscopy and the distal curl was seen in the bladder. The bladder was drained. The stent string was secured to the penis shaft with steri-strips    The patient tolerated the procedure well and there were no apparent complications. The patient  was transported to the postanesthesia care unit in stable condition.     POSTOP PLAN:  - Discharge  - Diflucan and omnicef for discharge  - Pull stent at home on Monday    Alcides Holland MD  Urology Resident    I, Nakul Casanova, was present and participatory for the entirety of the procedure

## 2022-01-26 NOTE — DISCHARGE INSTRUCTIONS
Stent/Ureteroscopy:    Activity  - There are no activity restrictions    Diet:  You may resume your regular diet.     Common symptoms related to the stent:  - You will likely notice some blood in the urine for as long as the stent is in place. You may also notice more blood in the urine after physical activity. Normal urine color can range from yellow to dark red. This is not problematic as long as you are able to empty your bladder. Although urine may seem quite bloody, a few drops of blood can color the entire toilet bowl red- much like food coloring. Increase your fluid intake to help flush the blood out of your bladder.   - You may also notice a twinge of pain in your kidney during urination. This can be severe, but should get better after the first few days with the stent. This is due to urine traveling backward (up the stent into your kidney) when the bladder squeezes. It is normal and not a cause for concern.  - You may feel like you need to urinate more frequently than usual. Some patients experience a lower abdominal cramping or  spasm . You may notice urine leakage from your urethra after this happens. This is due to the stent rubbing against your bladder wall and should get better over the next few days.  - You may experience some burning with urination due to minor trauma from the scope used during your surgery. This should disappear over the next few days.     Medications:  Anti-inflammatories/NSAIDs (Ibuprofen, Advil, Aleve) are the most effective pain relievers for stent-related discomfort. You may safely use these in combination with Tylenol.   You may also take Tylenol for pain control- but not more than 3000 mg daily.  Depending on your procedure, you may be given a limited supply of narcotic pain medications that you should use sparingly. These are typically not as effective as NSAIDs. These medications causes constipation so make sure to take a stool softener along with it. Do not drive while  "under the influence of narcotic pain medications.  Omnicef - take this antibiotic until finished  Diflucan - take this antifungal until finished    Removing your stent at home:  Some patients will discharge with a stent on a string- taped to the outside of your body.  On Monday, 01/31/2022, remove the tape from your body and pull the string with gentle force until the stent is removed. This is a long, thin, blue plastic tube.   Some patient are more comfortable pulling the stent in the bathtub or shower  You may notice a  tugging  sensation in your flank, but it should not be severely painful  You may notice flank discomfort for the next 24 hours after the stent is removed.   If the tape falls off before your stent removal date, any medical grade tape may be used to re-secure the strings. Just ensure the strings are not on tension.    Follow-Up:  - Follow up with Dr. Casanova  - Call or return sooner than your regularly scheduled visit if you develop any of the following: fever (greater than 101.5), uncontrolled pain, uncontrolled nausea or vomiting, or inability to urinate.    Phone numbers:   - Monday through Friday 8am to 4:30pm: Call [phone] with questions, requests for medication refills, or to schedule or confirm an appointment.  - Nights or weekends: call the after hours emergency pager - [phone] and tell the  \"I would like to page the Urology Resident on call.\" Please note, due to prescribing laws, resident physicians are unable to prescribe narcotics after-hours. If you feel as though you will need a refill of a narcotic pain medication, you will need to call the clinic during business hours OR seek emergency care.  - For emergencies, call 911    [facility]  Home Care Following Anesthesia  For 24 hours after surgery:  1. Get plenty of rest.  A responsible adult must stay with you for at least 24 hours after you leave the surgery center.  2. Do not " drive or use heavy equipment.  If you have weakness or tingling, don't drive or use heavy equipment until this feeling goes away.   3. Do not drink alcohol.   4. Avoid strenuous or risky activities.  Ask for help when climbing stairs.  5. You may feel lightheaded.  IF so, sit for a few minutes before standing.  Have someone help you get up.   6. If you have nausea (feel sick to your stomach): Drink only clear liquids such as apple juice, ginger ale, broth or 7-Up.  Rest may also help.  Be sure to drink enough fluids.  Move to a regular diet as you feel able.   7. You may have a slight fever.  Call the doctor if your fever is over 100 F (37.7 C) (taken under the tongue) or lasts longer than 24 hours.  8. You may have a dry mouth, a sore throat, muscle aches or trouble sleeping. These should go away after 24 hours.  9. Do not make important or legal decisions.   10. It is recommended to avoid smoking.               Tips for taking pain medications  To get the best pain relief possible, remember these points:    Take pain medications as directed, before pain becomes severe.    Pain medication can upset your stomach: taking it with food may help.    Constipation is a common side effect of pain medication. Drink plenty of  fluids.    Eat foods high in fiber. Take a stool softener if recommended by your doctor or pharmacist.    Do not drink alcohol, drive or operate machinery while taking pain medications.    Ask about other ways to control pain, such as with heat, ice or relaxation.    Tylenol/Acetaminophen Consumption  To help encourage the safe use of acetaminophen, the makers of TYLENOL  have lowered the maximum daily dose for single-ingredient Extra Strength TYLENOL  (acetaminophen) products sold in the [place] from 8 pills per day (4,000 mg) to 6 pills per day (3,000 mg). The dosing interval has also changed from 2 pills every 4-6 hours to 2 pills every 6 hours.    If you feel your pain relief is insufficient, you may  take Tylenol/Acetaminophen in addition to your narcotic pain medication.     Be careful not to exceed 3,000 mg of Tylenol/Acetaminophen in a 24 hour period from all sources.    If you are taking extra strength Tylenol/acetaminophen (500 mg), the maximum dose is 6 tablets in 24 hours.    If you are taking regular strength acetaminophen (325 mg), the maximum dose is 9 tablets in 24 hours.    Call a doctor for any of the followin. Signs of infection (fever, growing tenderness at the surgery site, a large amount of drainage or bleeding, severe pain, foul-smelling drainage, redness, swelling).  2. It has been over 8 to 10 hours since surgery and you are still not able to urinate (pass water).  3. Headache for over 24 hours.  4. Numbness, tingling or weakness the day after surgery (if you had spinal anesthesia).  5. Signs of Covid-19 infection (temperature over 100 degrees, shortness of breath, cough, loss of taste/smell, generalized body aches, persistent headache, chills, sore throat, nausea/vomiting/diarrhea)  Your doctor is:  Dr. Nakul Casanova, Prostate and Urology: [phone]                  Or dial [phone] and ask for the resident on call for:  Prostate Urology  For emergency care, call the:  [facility]:  [phone] (TTY for hearing impaired: [phone])

## 2022-01-26 NOTE — ANESTHESIA POSTPROCEDURE EVALUATION
Patient: Ramy Beasley    Procedure: Procedure(s):  CYSTOURETEROSCOPY, WITH LITHOTRIPSY USING LASER AND URETERAL LEFT STENT INSERTION       Diagnosis:Ureteral stone [N20.1]  Diagnosis Additional Information: No value filed.    Anesthesia Type:  General    Note:  Disposition: Outpatient   Postop Pain Control: Uneventful            Sign Out: Well controlled pain   PONV: No   Neuro/Psych: Uneventful            Sign Out: Acceptable/Baseline neuro status   Airway/Respiratory: Uneventful            Sign Out: Acceptable/Baseline resp. status   CV/Hemodynamics: Uneventful            Sign Out: Acceptable CV status; No obvious hypovolemia; No obvious fluid overload   Other NRE: NONE   DID A NON-ROUTINE EVENT OCCUR? No           Last vitals:  Vitals Value Taken Time   /67 01/26/22 1300   Temp 36.1  C (97  F) 01/26/22 1300   Pulse 82 01/26/22 1300   Resp 9 01/26/22 1300   SpO2 95 % 01/26/22 1300   Vitals shown include unvalidated device data.    Electronically Signed By: Demetrius Godwin DO  January 26, 2022  1:58 PM

## 2022-01-26 NOTE — ANESTHESIA CARE TRANSFER NOTE
Patient: Ramy Beasley    Procedure: Procedure(s):  CYSTOURETEROSCOPY, WITH LITHOTRIPSY USING LASER AND URETERAL LEFT STENT INSERTION       Diagnosis: Ureteral stone [N20.1]  Diagnosis Additional Information: No value filed.    Anesthesia Type:   General     Note:    Oropharynx: oropharynx clear of all foreign objects and spontaneously breathing  Level of Consciousness: drowsy  Oxygen Supplementation: face mask  Level of Supplemental Oxygen (L/min / FiO2): 6  Independent Airway: airway patency satisfactory and stable  Dentition: dentition unchanged  Vital Signs Stable: post-procedure vital signs reviewed and stable  Report to RN Given: handoff report given  Patient transferred to: PACU    Handoff Report: Identifed the Patient, Identified the Reponsible Provider, Reviewed the pertinent medical history, Discussed the surgical course, Reviewed Intra-OP anesthesia mangement and issues during anesthesia, Set expectations for post-procedure period and Allowed opportunity for questions and acknowledgement of understanding      Vitals:  Vitals Value Taken Time   BP     Temp     Pulse     Resp     SpO2         Electronically Signed By: MEREDITH Leung CRNA  January 26, 2022  12:26 PM

## 2022-01-27 ENCOUNTER — TELEPHONE (OUTPATIENT)
Dept: FAMILY MEDICINE | Facility: CLINIC | Age: 27
End: 2022-01-27
Payer: COMMERCIAL

## 2022-01-27 ENCOUNTER — TELEPHONE (OUTPATIENT)
Dept: SURGERY | Facility: CLINIC | Age: 27
End: 2022-01-27
Payer: COMMERCIAL

## 2022-01-27 ENCOUNTER — MEDICAL CORRESPONDENCE (OUTPATIENT)
Dept: HEALTH INFORMATION MANAGEMENT | Facility: CLINIC | Age: 27
End: 2022-01-27
Payer: COMMERCIAL

## 2022-01-28 ENCOUNTER — OFFICE VISIT (OUTPATIENT)
Dept: FAMILY MEDICINE | Facility: CLINIC | Age: 27
End: 2022-01-28
Payer: COMMERCIAL

## 2022-01-28 ENCOUNTER — PATIENT OUTREACH (OUTPATIENT)
Dept: UROLOGY | Facility: CLINIC | Age: 27
End: 2022-01-28

## 2022-01-28 ENCOUNTER — MEDICAL CORRESPONDENCE (OUTPATIENT)
Dept: HEALTH INFORMATION MANAGEMENT | Facility: CLINIC | Age: 27
End: 2022-01-28

## 2022-01-28 VITALS
TEMPERATURE: 97.6 F | HEART RATE: 88 BPM | RESPIRATION RATE: 16 BRPM | BODY MASS INDEX: 18.28 KG/M2 | DIASTOLIC BLOOD PRESSURE: 82 MMHG | WEIGHT: 147 LBS | HEIGHT: 75 IN | SYSTOLIC BLOOD PRESSURE: 110 MMHG

## 2022-01-28 DIAGNOSIS — Z23 HIGH PRIORITY FOR 2019-NCOV VACCINE: Primary | ICD-10-CM

## 2022-01-28 DIAGNOSIS — Z11.59 NEED FOR HEPATITIS C SCREENING TEST: ICD-10-CM

## 2022-01-28 DIAGNOSIS — C91.02 ACUTE LYMPHOBLASTIC LEUKEMIA (ALL) IN RELAPSE (H): ICD-10-CM

## 2022-01-28 DIAGNOSIS — R47.01 DOES NOT SPEAK: ICD-10-CM

## 2022-01-28 DIAGNOSIS — F84.0 ACTIVE AUTISTIC DISORDER: ICD-10-CM

## 2022-01-28 DIAGNOSIS — Z11.4 SCREENING FOR HIV (HUMAN IMMUNODEFICIENCY VIRUS): ICD-10-CM

## 2022-01-28 DIAGNOSIS — G40.909 NONINTRACTABLE EPILEPSY WITHOUT STATUS EPILEPTICUS, UNSPECIFIED EPILEPSY TYPE (H): ICD-10-CM

## 2022-01-28 DIAGNOSIS — Z23 NEED FOR VACCINATION: ICD-10-CM

## 2022-01-28 DIAGNOSIS — R62.50 DEVELOPMENTAL DELAY: ICD-10-CM

## 2022-01-28 PROBLEM — M87.00: Status: ACTIVE | Noted: 2021-11-09

## 2022-01-28 PROBLEM — Z91.81 AT HIGH RISK FOR FALLS: Status: ACTIVE | Noted: 2022-01-28

## 2022-01-28 PROCEDURE — 90472 IMMUNIZATION ADMIN EACH ADD: CPT | Performed by: FAMILY MEDICINE

## 2022-01-28 PROCEDURE — 91305 COVID-19,PF,PFIZER (12+ YRS): CPT | Performed by: FAMILY MEDICINE

## 2022-01-28 PROCEDURE — 99204 OFFICE O/P NEW MOD 45 MIN: CPT | Mod: 25 | Performed by: FAMILY MEDICINE

## 2022-01-28 PROCEDURE — 90686 IIV4 VACC NO PRSV 0.5 ML IM: CPT | Performed by: FAMILY MEDICINE

## 2022-01-28 PROCEDURE — 90670 PCV13 VACCINE IM: CPT | Performed by: FAMILY MEDICINE

## 2022-01-28 PROCEDURE — 90471 IMMUNIZATION ADMIN: CPT | Performed by: FAMILY MEDICINE

## 2022-01-28 PROCEDURE — 0051A COVID-19,PF,PFIZER (12+ YRS): CPT | Performed by: FAMILY MEDICINE

## 2022-01-28 RX ORDER — AMOXICILLIN 500 MG
1200 CAPSULE ORAL DAILY
COMMUNITY

## 2022-01-28 RX ORDER — ACETAMINOPHEN 500 MG
500 TABLET ORAL PRN
COMMUNITY
Start: 2021-11-08 | End: 2022-03-01

## 2022-01-28 RX ORDER — IBUPROFEN 200 MG
400 TABLET ORAL PRN
COMMUNITY
End: 2022-03-01

## 2022-01-28 RX ORDER — DIAZEPAM 20 MG/4G
GEL RECTAL
COMMUNITY
Start: 2021-07-01 | End: 2022-12-13

## 2022-01-28 ASSESSMENT — MIFFLIN-ST. JEOR: SCORE: 1737.42

## 2022-01-28 NOTE — ASSESSMENT & PLAN NOTE
Followed by neurologist who also is the prescription authority for his current medications.  Overall it is going well.  Most recent slight worsening of his seizure activity is most likely secondary to the recent anesthesia and not the current treatment medications he was given.  However after reviewing most recent lab work and there worry of drug interactions will hold Diflucan and only give if symptoms recur.  But to continue the antibiotic.

## 2022-01-28 NOTE — PROGRESS NOTES
Problem List Items Addressed This Visit        Nervous and Auditory    Epilepsy And Recurrent Seizures     Followed by neurologist who also is the prescription authority for his current medications.  Overall it is going well.  Most recent slight worsening of his seizure activity is most likely secondary to the recent anesthesia and not the current treatment medications he was given.  However after reviewing most recent lab work and there worry of drug interactions will hold Diflucan and only give if symptoms recur.  But to continue the antibiotic.         Relevant Medications    diazepam (DIASTAT) 20 MG GEL rectal gel       Hematologic    Acute lymphoblastic leukemia (ALL) (H)     Under care of oncology and neurology            Behavioral    Autistic Disorder     Medical records recent treatment as well as most recent labs were reviewed.  Weight is improving since starting G-tube feedings.  Parents are his primary caregivers.  They have been doing a very good job of his care.         Developmental delay     Autism with significant seizure disorder            Other    Does not speak     Parents are communicator           Other Visit Diagnoses     High priority for 2019-nCoV vaccine    -  Primary    Relevant Orders    COVID-19,PF,PFIZER (12+ Yrs GRAY LABEL) (Completed)    Screening for HIV (human immunodeficiency virus)        Need for hepatitis C screening test        Need for vaccination        Relevant Orders    HC FLU VAC PRESRV FREE QUAD SPLIT VIR > 6 MONTHS IM ([id number]) (Completed)    PNEUMOCOC CONJ VAC 13 CANDELARIA (MNVAC) ([id number]) (Completed)        Return in about 1 year (around 1/28/2023) for Routine preventive.    Madeleine Mccann is a 27 year old who presents for the following health issues  accompanied by his mother and father.    Establish care.  Patient has a very complicated longstanding history to include severe autism along with seizure disorder, as well as leukemia.  He is at a very high risk of  "falls.  Undergoing chemotherapy as well as most recently had acute encephalopathy which resulted in failure to thrive type symptoms.  A G-tube was added and he has been receiving nutrition through them for the past week.  Showing no signs of dehydration.  And he is showing signs of gaining weight.  Most recent labs were reviewed.  He did have anesthesia yesterday and yesterday evening did have some mild seizure activity that parents were able to control.  But that was a little more active than they have seen previously.  Discussed with parents that is most likely secondary to the recent anesthesia and not the antibiotics or Diflucan.  However I did agree that Diflucan does have interactions with some of his antiseizure meds.    Imm/Inj         Review of Systems   All other systems reviewed and are negative.           Objective    /82 (BP Location: Right arm, Patient Position: Sitting, Cuff Size: Adult Large)   Pulse 88   Temp 97.6  F (36.4  C) (Axillary)   Resp 16   Ht 1.905 m (6' 3\")   Wt 66.7 kg (147 lb)   BMI 18.37 kg/m    Body mass index is 18.37 kg/m .  Physical Exam  Vitals and nursing note reviewed.   Constitutional:       General: He is not in acute distress.     Appearance: He is not ill-appearing or toxic-appearing.   HENT:      Head: Normocephalic and atraumatic.      Right Ear: Ear canal and external ear normal.      Left Ear: Ear canal and external ear normal.   Cardiovascular:      Rate and Rhythm: Normal rate and regular rhythm.      Pulses: Normal pulses.      Heart sounds: Normal heart sounds.   Pulmonary:      Effort: Pulmonary effort is normal.      Breath sounds: Normal breath sounds.   Musculoskeletal:      Right lower leg: No edema.      Left lower leg: No edema.   Lymphadenopathy:      Cervical: No cervical adenopathy.   Skin:     General: Skin is warm.      Capillary Refill: Capillary refill takes less than 2 seconds.      Findings: No rash.   Neurological:      Mental Status: " Mental status is at baseline.   Psychiatric:         Attention and Perception: Attention normal.         Speech: Speech normal.

## 2022-01-28 NOTE — ASSESSMENT & PLAN NOTE
Medical records recent treatment as well as most recent labs were reviewed.  Weight is improving since starting G-tube feedings.  Parents are his primary caregivers.  They have been doing a very good job of his care.

## 2022-01-28 NOTE — TELEPHONE ENCOUNTER
"Telephone Encounter    Date: 6:12 PM; 1/27/2022  Patient Name: Ramy Beasley  MRN: [id number]    Ramy Beasley is 27 year old male who is s/p left URS with HLL and left stent for a left ureteral stone with Dr. Casanova on 1/26/22. His father called reporting \"neurologic changes\" and concern for medication reaction between fluconazole and clonopin which he read online may be the source of his patient's sx. Father is a poor historian and does not describe the symptoms well but says patient is more lethargic and not acting like himself. He is on the autism spectrum and does not verbalize well but is able to communicate. He has not been communicating as well today. His father says he was better last night after surgery. He has a home nurse who was there today and took patient's temperature and he was afebrile.     He has had fevers in the past with UTIs and father says he doesn't seem sick in that way. Denies chills, nausea, vomiting, inability to urinate or worsening blood in the urine.    Pre-op urine culture was negative. Currently on fluconzaole and cefdinir post-operatively - unclear per op note if there was concern for infection (not explicitly stated). Intra-op culture is pending.    Plans:  - Unclear if the medication reaction is truly source of these symptoms. Differential is broad including delayed recovery after anesthesia or post-op infection. Advised father that he could trial stopping the antimicrobials though there is risk associated with that, romeo. If patient is at the onset of a post-op infection. Father plans to try to this and monitor closely for signs of infection in which case he will present to hospital.  - Patient advised that because this is an encounter performed over the telephone, I am not able to perform a physical exam and thus any advice given is limited in nature and may not be completely informed.  The patient accepts this risk and if they have concerns with it they should be seen and " evaluated in person by a medical professional.   - Discussed strict return precautions, including but not limited to: fevers > 101 F, chills, an inability to keep food or fluids down, increasing hematuria, and an inability to urinate.  - Patient's father expressed understanding and was in agreement with plan.  - He will also plan to update our clinic tomorrow with patient's progress and to determine if intra-op cultures are back in which case restarting an antibiotic may be necessary.    --    Delio Zapata MD  Urology, PGY-4

## 2022-01-29 LAB
APPEARANCE STONE: NORMAL
COMPN STONE: NORMAL
GRAM STAIN RESULT: NORMAL
GRAM STAIN RESULT: NORMAL
SPECIMEN WT: 14 MG

## 2022-02-01 DIAGNOSIS — N20.0 KIDNEY STONE: Primary | ICD-10-CM

## 2022-02-01 LAB — BACTERIA SPEC CULT: ABNORMAL

## 2022-02-06 ENCOUNTER — NURSE TRIAGE (OUTPATIENT)
Dept: NURSING | Facility: CLINIC | Age: 27
End: 2022-02-06
Payer: COMMERCIAL

## 2022-02-06 NOTE — TELEPHONE ENCOUNTER
Mom and dad are calling in about their 27 year old son who has frequent sinus issues. Pt is non verbal, has Aspergers, and a feeding tube.  Mom reports he is having trouble breathing though his nose due to the nasal congestion, but no difficulty breathing. Mom denies any fever, but notices foul smelling breath.   Care advice given, and per protocol mom and dad should be able to treat this at home. Mom and dad are requesting a telephone visit, as they would like to get antibiotics. Mom and dad were transferred to scheduling. Mom was advised to call back if symptoms worsen. Mom verbalized understanding.     Wade Paul RN on 2/6/2022 at 11:04 AM      Reason for Disposition    [1] Sinus congestion as part of a cold AND [2] present < 10 days    Additional Information    Negative: Severe difficulty breathing (e.g., struggling for each breath, speaks in single words)    Negative: Sounds like a life-threatening emergency to the triager    Negative: [1] Sinus infection AND [2] taking an antibiotic AND [3] symptoms continue    Negative: [1] Difficulty breathing AND [2] not from stuffy nose (e.g., not relieved by cleaning out the nose)    Negative: [1] SEVERE headache AND [2] fever    Negative: [1] Redness or swelling on the cheek, forehead or around the eye AND [2] fever    Negative: Fever > 104 F (40 C)    Negative: Patient sounds very sick or weak to the triager    Negative: [1] SEVERE pain AND [2] not improved 2 hours after pain medicine    Negative: [1] Redness or swelling on the cheek, forehead or around the eye AND [2] no fever    Negative: [1] Fever > 101 F (38.3 C) AND [2] age > 60    Negative: [1] Fever > 100.0 F (37.8 C) AND [2] bedridden (e.g., nursing home patient, CVA, chronic illness, recovering from surgery)    Negative: [1] Fever > 100.0 F (37.8 C) AND [2] diabetes mellitus or weak immune system (e.g., HIV positive, cancer chemo, splenectomy, organ transplant, chronic steroids)    Negative: Fever  present > 3 days (72 hours)    Negative: [1] Fever returns after gone for over 24 hours AND [2] symptoms worse or not improved    Negative: [1] Sinus pain (not just congestion) AND [2] fever    Negative: Earache    Negative: [1] Sinus congestion (pressure, fullness) AND [2] present > 10 days    Negative: [1] Nasal discharge AND [2] present > 10 days    Negative: [1] Using nasal washes and pain medicine > 24 hours AND [2] sinus pain (around cheekbone or eye) persists    Negative: Lots of coughing    Protocols used: SINUS PAIN OR CONGESTION-A-AH

## 2022-02-07 ENCOUNTER — TELEPHONE (OUTPATIENT)
Dept: FAMILY MEDICINE | Facility: CLINIC | Age: 27
End: 2022-02-07
Payer: COMMERCIAL

## 2022-02-07 DIAGNOSIS — R62.50 DEVELOPMENTAL DELAY: ICD-10-CM

## 2022-02-07 DIAGNOSIS — R47.01 DOES NOT SPEAK: ICD-10-CM

## 2022-02-07 DIAGNOSIS — F84.0 ACTIVE AUTISTIC DISORDER: ICD-10-CM

## 2022-02-07 DIAGNOSIS — M87.00 AVASCULAR BONE NECROSIS (H): ICD-10-CM

## 2022-02-07 DIAGNOSIS — G40.909 NONINTRACTABLE EPILEPSY WITHOUT STATUS EPILEPTICUS, UNSPECIFIED EPILEPSY TYPE (H): Primary | ICD-10-CM

## 2022-02-07 DIAGNOSIS — G90.9 AUTONOMIC NEUROPATHY: ICD-10-CM

## 2022-02-07 DIAGNOSIS — C91.02 ACUTE LYMPHOBLASTIC LEUKEMIA (ALL) IN RELAPSE (H): ICD-10-CM

## 2022-02-07 NOTE — TELEPHONE ENCOUNTER
Nursing care comes once per week and they are    Requesting referral for care coordination for help with all the things they have going on with patient  Please advise if you will put in a referral for them

## 2022-02-10 NOTE — TELEPHONE ENCOUNTER
Yes, what actions do I need to do to make this happen? (which company is the nursing care coming from so we know who to contact and where to send)

## 2022-02-11 ENCOUNTER — MEDICAL CORRESPONDENCE (OUTPATIENT)
Dept: HEALTH INFORMATION MANAGEMENT | Facility: CLINIC | Age: 27
End: 2022-02-11
Payer: COMMERCIAL

## 2022-02-11 NOTE — TELEPHONE ENCOUNTER
Reason contacted:  Care Coordination  Information relayed:  As per PCP note below.  Additional questions:  No  Further follow-up needed:  Yes, as discussed w/PCP, Care Coordination needed.  Okay to leave a detailed message:  Yes

## 2022-02-11 NOTE — TELEPHONE ENCOUNTER
27-year-old male with severe autism as well as significant other underlying diagnosis.  Currently undergoing treatment for leukemia, has known avascular necrosis of the right hip, also recently placed on G-tube.  Does have home health nursing assigned for weekly visits to help with feeding and G-tube.  Parents are the primary healthcare workers and caretakers.  Given the number of specialists involved in his care, the testing involved in his care, parents having difficulty keeping track in timing appointments.  Parents requesting help from care coordination in keeping track of the specialty appointments, testing, assistance in making appointments when needed.  Care coordination referral placed.

## 2022-02-14 ENCOUNTER — PATIENT OUTREACH (OUTPATIENT)
Dept: CARE COORDINATION | Facility: CLINIC | Age: 27
End: 2022-02-14
Payer: COMMERCIAL

## 2022-02-14 NOTE — PROGRESS NOTES
Clinic Care Coordination Contact  [facility]/Voicemail    Clinical Data: Care Coordinator Outreach  Outreach attempted x 1.  Left message on patients father Chavo elaine with call back information and requested return call.  Plan: Care Coordinator will try to reach patient again in 1-2 business days.    Additional Information: Coordination of care. Multiple specialist, needs, transporation, etc.     Evelyn Demarco  [facility] Health Worker  [facility] Care Coordination   Office: [phone]

## 2022-02-15 ENCOUNTER — PATIENT OUTREACH (OUTPATIENT)
Dept: CARE COORDINATION | Facility: CLINIC | Age: 27
End: 2022-02-15
Payer: COMMERCIAL

## 2022-02-15 NOTE — PROGRESS NOTES
Clinic Care Coordination Contact  Community Health Worker Initial Outreach    CHW spoke with patients father Harvey     CHW Initial Information Gathering:  Referral Source: PCP  Preferred Hospital: [facility]  [phone]  Current living arrangement:: I live in a private home with family  Type of residence:: Private home - stairs  Community Resources: None  Supplies used at home:: Enteral Nutrition & Supplies  Informal Support system:: Parent  No PCP office visit in Past Year: No  Transportation means:: None      Patient accepts CC: Yes. Patient scheduled for assessment with RN on 02/16/2022 at 9:00am. Patient noted desire to discuss nutritionist and feeding tube.     Patients father discussed the need of a nutritionist for patient.        Karen Williamson  Community Health Worker  [facility]  Clinic Care Coordination  [email or web address]  Office: [phone]

## 2022-02-16 ENCOUNTER — PATIENT OUTREACH (OUTPATIENT)
Dept: NURSING | Facility: CLINIC | Age: 27
End: 2022-02-16

## 2022-02-16 DIAGNOSIS — C91.02 ACUTE LYMPHOBLASTIC LEUKEMIA (ALL) IN RELAPSE (H): ICD-10-CM

## 2022-02-16 DIAGNOSIS — F84.0 ACTIVE AUTISTIC DISORDER: ICD-10-CM

## 2022-02-16 DIAGNOSIS — G40.909 NONINTRACTABLE EPILEPSY WITHOUT STATUS EPILEPTICUS, UNSPECIFIED EPILEPSY TYPE (H): Primary | ICD-10-CM

## 2022-02-16 DIAGNOSIS — R47.01 DOES NOT SPEAK: ICD-10-CM

## 2022-02-16 DIAGNOSIS — R62.50 DEVELOPMENTAL DELAY: ICD-10-CM

## 2022-02-16 DIAGNOSIS — M87.00 AVASCULAR BONE NECROSIS (H): ICD-10-CM

## 2022-02-16 DIAGNOSIS — G90.9 AUTONOMIC NEUROPATHY: ICD-10-CM

## 2022-02-16 DIAGNOSIS — G40.909 NONINTRACTABLE EPILEPSY WITHOUT STATUS EPILEPTICUS, UNSPECIFIED EPILEPSY TYPE (H): ICD-10-CM

## 2022-02-16 ASSESSMENT — ACTIVITIES OF DAILY LIVING (ADL)
DEPENDENT_IADLS:: CLEANING;COOKING;LAUNDRY;SHOPPING;MONEY MANAGEMENT;MEDICATION MANAGEMENT;MEAL PREPARATION;TRANSPORTATION

## 2022-02-17 ENCOUNTER — TELEPHONE (OUTPATIENT)
Dept: FAMILY MEDICINE | Facility: CLINIC | Age: 27
End: 2022-02-17
Payer: COMMERCIAL

## 2022-02-17 ENCOUNTER — MEDICAL CORRESPONDENCE (OUTPATIENT)
Dept: HEALTH INFORMATION MANAGEMENT | Facility: CLINIC | Age: 27
End: 2022-02-17
Payer: COMMERCIAL

## 2022-02-17 NOTE — TELEPHONE ENCOUNTER
Father Chavo is calling about G tube.  Seems Ramy tries to rip it out daily.    States they are still using g tube for all feeding as directed  He is eating orally all day long they are wondering when G tube can be removed.    Seems they can not get into dietitian is Monday March 28th.  He has a lot of questions about nutrition and over feeding.

## 2022-02-17 NOTE — TELEPHONE ENCOUNTER
"[facility] RN spoke with Dad yesterday.  He stated at that time that unfortunately yes Ramy is trying to pull out his tube on a daily basis but states that he is always supervised 24 hours a day.  Writer attempted to strongly encourage for Ramy to wear the Abdominal Binder at all times.  Dad declined this option and stated that Ramy was instead wearing some type of vest that [facility] RN was unfamiliar with.  Chavo stating that it made the tube out of sight for him.  However when Ramy needed to use the restroom the vest then needed to be removed and this was cumbersome.  Writer attempted several different troubleshooting scenarios of the abdominal binder and the vest, etc.  Ultimately Chavo is wanting the tube removed.  [facility] RN instructed him yesterday that he would have to have it for \"some time\".  He would need an updated weight, labs, etc.  Sounds like he unfortunately cancelled his virtual visit with Dr. Ferrer yesterday.  Possibly the clinic could reschedule this appointment with PCP to discuss further.  Hope this insight is helpful-Alida De La Garza RN  "

## 2022-02-17 NOTE — TELEPHONE ENCOUNTER
Reason for Call:  Other FYI only    Detailed comments: Ladonna, homecare nurse calling to inform patient refused services 2/17/2022. Patient sick and did not want to expose healthcare workers.    Call taken on 2/17/2022 at 9:55 AM by Desirae Sunshine

## 2022-02-18 ENCOUNTER — IMMUNIZATION (OUTPATIENT)
Dept: NURSING | Facility: CLINIC | Age: 27
End: 2022-02-18
Attending: FAMILY MEDICINE
Payer: COMMERCIAL

## 2022-02-18 PROCEDURE — 91305 COVID-19,PF,PFIZER (12+ YRS): CPT

## 2022-02-18 PROCEDURE — 0052A COVID-19,PF,PFIZER (12+ YRS): CPT

## 2022-02-21 ENCOUNTER — PATIENT OUTREACH (OUTPATIENT)
Dept: CARE COORDINATION | Facility: CLINIC | Age: 27
End: 2022-02-21
Payer: COMMERCIAL

## 2022-02-21 ENCOUNTER — TELEPHONE (OUTPATIENT)
Dept: FAMILY MEDICINE | Facility: CLINIC | Age: 27
End: 2022-02-21
Payer: COMMERCIAL

## 2022-02-21 NOTE — PROGRESS NOTES
"Clinic Care Coordination Contact    [facility] [place] RN called and spoke with dad Chavo.  He was voicing frustration regarding not being able to get into registered dietician until April.  At this time writer is unable to locate an existing appointment for the patient in April.  It is unclear if dad did not schedule due to his frustration.  Chavo stating he was going to take Ramy to the ER to talk to a dietician.  Writer telling dad that there aren't dieticians in the ER and that wasn't an emergency or adequate use of the Emergency Room staff.  Chavo stating that he wanted to get care for his son and didn't want to wait until April.  Wanted a dietician \"this week\".  Writer again instructed Chavo that I would notify all parties involved.  Ramy was weighed at [facility] on 2/18 fully dressed-158 lbs  At time of G-tube insertion Hospital admission 1 month ago-147 lbs    PCP-Do you have any recommendations for the dad?  Registered Dieticians-Do you have any availability to see this patient any sooner than April?  "

## 2022-02-21 NOTE — TELEPHONE ENCOUNTER
Reason contacted:  Forms  Information relayed:  Ready for P/U.  Additional questions:  No  Further follow-up needed:  No  Okay to leave a detailed message:  Yes

## 2022-02-21 NOTE — PROGRESS NOTES
Clinic Care Coordination Contact    Called and notified dad Chavo that WALT Miranda was in need of a copy of the Guardianship paperwork for Ramy.  He stated he was unable to write anything down at the moment and asked that I e-mail his wife the information.    Writer emailed wife Mildred asking for them to e-mail the Legal Guardianship paperwork to [email or web address]    CCC Goal created

## 2022-02-22 ENCOUNTER — PATIENT OUTREACH (OUTPATIENT)
Dept: CARE COORDINATION | Facility: CLINIC | Age: 27
End: 2022-02-22
Payer: COMMERCIAL

## 2022-02-22 ENCOUNTER — DOCUMENTATION ONLY (OUTPATIENT)
Dept: OTHER | Facility: CLINIC | Age: 27
End: 2022-02-22
Payer: COMMERCIAL

## 2022-02-22 ENCOUNTER — TELEPHONE (OUTPATIENT)
Dept: FAMILY MEDICINE | Facility: CLINIC | Age: 27
End: 2022-02-22
Payer: COMMERCIAL

## 2022-02-22 DIAGNOSIS — R63.4 WEIGHT LOSS: ICD-10-CM

## 2022-02-22 DIAGNOSIS — R63.30 FEEDING DIFFICULTIES: Primary | ICD-10-CM

## 2022-02-22 DIAGNOSIS — F84.0 ACTIVE AUTISTIC DISORDER: Primary | ICD-10-CM

## 2022-02-22 DIAGNOSIS — R63.30 FEEDING DIFFICULTIES: ICD-10-CM

## 2022-02-22 NOTE — TELEPHONE ENCOUNTER
Problem List Items Addressed This Visit        Behavioral    Autistic Disorder - Primary       Other    Feeding difficulties     10 pound weight gain. Eating oral and still getting 1 bottle of isosource daily through 6 feedings with 60 ml water flush and 120 ml water after each feeding.    Oral intake back to similar prior to COVID infection.     Decrease to 4 feedings per day, using same amount and flushes that has been used at each of the 6 feedings (decrease in g-tube intake by 2/3).     Will check CBC and CMP to determine protein nutrition status. Plan to decrease g-tube feeds over course of next 2 weeks and then look to having g-tube removed.         Relevant Orders    CBC with platelets    Comprehensive metabolic panel

## 2022-02-22 NOTE — ASSESSMENT & PLAN NOTE
10 pound weight gain. Eating oral and still getting 1 bottle of isosource daily through 6 feedings with 60 ml water flush and 120 ml water after each feeding.    Oral intake back to similar prior to COVID infection.     Decrease to 4 feedings per day, using same amount and flushes that has been used at each of the 6 feedings (decrease in g-tube intake by 2/3).     Will check CBC and CMP to determine protein nutrition status. Plan to decrease g-tube feeds over course of next 2 weeks and then look to having g-tube removed.

## 2022-02-22 NOTE — PROGRESS NOTES
Clinic Care Coordination Contact    Clinic Care Coordination Contact  OUTREACH    Referral Information:  Referral Source: PCP    Primary Diagnosis: Frailty/Failure to thrive    Chief Complaint   Patient presents with     Clinic Care Coordination - Initial        Clinic Utilization  Difficulty keeping appointments:: No  Compliance Concerns: No  No-Show Concerns: No  No PCP office visit in Past Year: No  Utilization    Hospital Admissions  0             ED Visits  0             No Show Count (past year)  1                Current as of: 2/22/2022  9:40 AM              Clinical Concerns:  Current Medical Concerns:  ALL, seizure disorder, autistic disorder,autonomic disorder, severe malnutrition, encephalopathy, dehydration, G-tube  Current Behavioral Concerns:     Education Provided to patient: yes   Pain  Pain (GOAL):: No  Health Maintenance Reviewed: Up to date  Clinical Pathway: none    Medication Management:  Medication review status:  Medications reviewed and no changes noted.  Mel Levy manages all medications.   Ramy takes his medications via applesauce.        Functional Status:  Dependent ADLs:: Wheelchair-with assist, Ambulation-no assistive device, Toileting  Dependent IADLs:: Cleaning, Cooking, Laundry, Shopping, Money Management, Medication Management, Meal Preparation, Transportation  Bed or wheelchair confined:: No    Living Situation:  Current living arrangement:: I live in a private home with family  Type of residence:: Private home - stairs    Lifestyle & Psychosocial Needs:    Social Determinants of Health     Tobacco Use: Low Risk      Smoking Tobacco Use: Never Smoker     Smokeless Tobacco Use: Never Used   Alcohol Use: Not on file   Financial Resource Strain: Not on file   Food Insecurity: Not on file   Transportation Needs: Not on file   Physical Activity: Not on file   Stress: Not on file   Social Connections: Not on file   Intimate Partner Violence: Not on file   Depression: Not on file  "  Housing Stability: Not on file     Diet:: Regular, Other (TF)  Inadequate nutrition (GOAL):: Yes  Tube Feeding: Yes  Tube Feeding: G-tube  Inadequate activity/exercise (GOAL):: No  Significant changes in sleep pattern (GOAL): No  Transportation means:: Regular car     Synagogue or spiritual beliefs that impact treatment:: No  Mental health DX:: No  Mental health management concern (GOAL):: No  Informal Support system:: Parent, Other (sibling)       Resources and Interventions:  Current Resources:      Community Resources: [facility], [facility], PCA  Supplies used at home:: Enteral Nutrition & Supplies  Equipment Currently Used at Home: hospital bed, wheelchair, manual, shower chair  Employment Status: disabled     Referrals Placed: Other  (Nutritional Services)        27 yr M PMH: ALL, seizure disorder, autistic disorder,autonomic disorder, severe malnutrition, encephalopathy, dehydration, G-tube.  Recently admitted 1/19-1/24 for severe malnutrition, encephalopathy, dehydration.  Had a G-tube placed at that time.  Patient's weight had a 40 pound weight loss from Nov due to \"poor eating and decreased alertness\".   Patient has a [facility] .  His sister is his PCA for 40 hours a week.  They are renting a hospital bed from [employer] but would like to own a hospital bed.  Goal created to assist with this.  This will have to come out of his Waiver.  Appointment to be made in the future regarding this.  Ramy is walks with assistance.  The family has a manual WC for appointments or in the home if needed.  The family does not need assistance with transportation.  The family has a large van they use for transportation and this is not an issue.  Currently [facility] is coming to assist with the G-tube.  He is receiving [employer] Isosource 1.5 6 cans a day.  During this conversation dad stating that the entire family had been battling a stomach flu with intermittent vomiting.  Ramy had vomited " once yesterday and once today.  He had turned his feedings off yesterday after he vomited and again today turned his feedings off after he vomited.  He is giving him Pedialyte.  Virtual visit with PCP later today.      Additional education given regarding feeding tube:  Ramy is trying to pull out his tube on a daily basis but states that he is always supervised 24 hours a day.  Writer attempted to strongly encourage for Ramy to wear the Abdominal Binder at all times.  Dad declined this option and stated that Ramy was instead wearing some type of vest that CCC RN was unfamiliar with.  Chavo stating that it made the tube out of sight for him.  However when Ramy needed to use the restroom the vest then needed to be removed and this was cumbersome.  Writer attempted several different troubleshooting scenarios of the abdominal binder and the vest, etc.  Ultimately Chavo is wanting the tube removed.  CCC RN placed a Registered Dietician referral and a note to the PCP.    Patient Enrolled for Goals as listed below.     Goals:   Goals        General     Healthy Eating (pt-stated)      Notes - Note edited  2/22/2022 11:43 AM by Alida De La Garza, RN     # 1 Goal Statement: I would like to obtain a Registered Dietician Referral in the next 30-90 days.  Date Goal set: 2/16/22  Barriers: overwhelmed  Strengths: motivated  Date to Achieve By: 30-90 days  Patient expressed understanding of goal: spoke with riki Chavooseas  Action steps to achieve this goal:  1. CCC RN sent a message to the PCP to ask for a Referral-Done  2. [facility] RN spoke with Chavo this morning.  Telephone encounter pending regarding scheduling-Cont           Other (pt-stated)      Notes - Note edited  2/22/2022 11:43 AM by Alida De La Garza, RN     # 2 Goal Statement: I would like to e-mail a copy of the Legal Guardian paperwork to [employer] in the next 30 days.  Date Goal set: 2/21/22  Barriers: overwhelmed  Strengths:   Date to Achieve By: 30 days  Patient  expressed understanding of goal: spoke with riki Tony, yes  Action steps to achieve this goal:  1. I will e-mail a copy of the Guardianship paperwork to [email or web address] as soon as possible.           Other (pt-stated)      Notes - Note edited  2/22/2022 11:46 AM by Alida De La Garza, RN     # 3 Goal Statement: I would like to speak with Dr. Ferrer regarding obtaining a hospital bed in the next 6 months.  Date Goal set: 2/16/22  Barriers:   Strengths: motivated  Date to Achieve By:   Patient expressed understanding of goal: spoke with riki Tony  Action steps to achieve this goal:  1. I will speak with Dr. Ferrer regarding a hospital bed.  2. Any additional questions, paperwork, etc can go through [facility] .              Patient/Caregiver understanding: Riki Tony stated an understanding    Outreach Frequency: monthly  Future Appointments              In 1 week [facility] US2 M [facility], [facility]    In 2 weeks Charlee Kirkpatrick CNP [facility]    In 3 weeks Nakul Casanova MD [facility]          Plan: DELEGATION: NONE

## 2022-02-22 NOTE — TELEPHONE ENCOUNTER
Reason for Call:  Dad is calling to request a referral for a dietician. States he can not get in to [facility] until April, patient is on a feeding tube, and would like to get in sooner. Would be willing to go outside of [facility] if provider can suggest where they could go. Is also waiting to hear from insurance company for suggestions.    Detailed comments: please call Chavo back to discuss. Patient has had feeding tube for 33 days now.    Phone Number Patient can be reached at: Home number on file [phone] (home)    Best Time: any    Can we leave a detailed message on this number? YES    Call taken on 2/22/2022 at 8:38 AM by Maureen Smith

## 2022-02-22 NOTE — PROGRESS NOTES
Clinic Care Coordination Contact    Community Health Worker Follow Up    Care Gaps:     Health Maintenance Due   Topic Date Due     PREVENTIVE CARE VISIT  Never done     HIV SCREENING  Never done     HEPATITIS C SCREENING  Never done       Postponed to next CHW outreach     ** CHW received VM from patients father Chavo asking for assistance with getting a Dietician appointment set up to get patients feeding tube out.     CHW consulted with CCC RN. CCC RN  2/21/22 and PCP 2/22/22 spoke with patients riki Tony regarding feeding tube and seeing a Dietician.     CHW Plan: CHW will follow up with patients father on 3/16/22.    Evelyn Demarco  [facility] Health Worker  [facility]  Clinic Care Coordination   Office: [phone]

## 2022-02-22 NOTE — TELEPHONE ENCOUNTER
Abbey from [facility] is calling with some options for the patient.  [email or web address] [place] , 3/17 availability  [facility] 3/8, 3/15 availability  [email or web address]], [facility], 2/25, 3/3 availablility.    [phone] to speak to Abbey.

## 2022-02-22 NOTE — TELEPHONE ENCOUNTER
Please call Ladonna at [facility] [phone] and see what they need to do a blood draw at her next visit with Ramy, specifically a CMP and CBC.     Also let her know, I did decrease his g-tube feedings to 4 times a day from 6 and already discussed with family.     Of note, will also need a weight on Ramy next week. Will need a nurse visit as we are tracking closely and need to use clinic scale.

## 2022-02-23 NOTE — TELEPHONE ENCOUNTER
Reason contacted:  Orders  Information relayed:  As per PCP note below.  Additional questions:  No  Further follow-up needed:  Yes, place lab orders and Fax to [facility], c/o Ladonna to [phone].  Okay to leave a detailed message:  Yes

## 2022-02-24 ENCOUNTER — PRE VISIT (OUTPATIENT)
Dept: UROLOGY | Facility: CLINIC | Age: 27
End: 2022-02-24
Payer: COMMERCIAL

## 2022-02-24 NOTE — TELEPHONE ENCOUNTER
Reason for visit: post op      Dx/Hx/Sx: left sided ureteral stone     Records/imaging/labs/orders: renal US scheduled 3/3    At Rooming: ideo visit

## 2022-02-28 ENCOUNTER — LAB (OUTPATIENT)
Dept: LAB | Facility: CLINIC | Age: 27
End: 2022-02-28
Payer: COMMERCIAL

## 2022-02-28 ENCOUNTER — TELEPHONE (OUTPATIENT)
Dept: FAMILY MEDICINE | Facility: CLINIC | Age: 27
End: 2022-02-28

## 2022-02-28 DIAGNOSIS — Z11.4 SCREENING FOR HIV (HUMAN IMMUNODEFICIENCY VIRUS): ICD-10-CM

## 2022-02-28 DIAGNOSIS — Z11.59 NEED FOR HEPATITIS C SCREENING TEST: ICD-10-CM

## 2022-02-28 DIAGNOSIS — R63.30 FEEDING DIFFICULTIES: ICD-10-CM

## 2022-02-28 DIAGNOSIS — R63.4 WEIGHT LOSS: ICD-10-CM

## 2022-02-28 LAB
ALBUMIN SERPL-MCNC: 4 G/DL (ref 3.5–5)
ALP SERPL-CCNC: 165 U/L (ref 45–120)
ALT SERPL W P-5'-P-CCNC: 22 U/L (ref 0–45)
ANION GAP SERPL CALCULATED.3IONS-SCNC: 13 MMOL/L (ref 5–18)
AST SERPL W P-5'-P-CCNC: 16 U/L (ref 0–40)
BILIRUB SERPL-MCNC: 0.2 MG/DL (ref 0–1)
BUN SERPL-MCNC: 24 MG/DL (ref 8–22)
CALCIUM SERPL-MCNC: 9.2 MG/DL (ref 8.5–10.5)
CHLORIDE BLD-SCNC: 106 MMOL/L (ref 98–107)
CO2 SERPL-SCNC: 22 MMOL/L (ref 22–31)
CREAT SERPL-MCNC: 0.87 MG/DL (ref 0.7–1.3)
ERYTHROCYTE [DISTWIDTH] IN BLOOD BY AUTOMATED COUNT: 13.1 % (ref 10–15)
GFR SERPL CREATININE-BSD FRML MDRD: >90 ML/MIN/1.73M2
GLUCOSE BLD-MCNC: 157 MG/DL (ref 70–125)
HCT VFR BLD AUTO: 38.6 % (ref 40–53)
HGB BLD-MCNC: 12.6 G/DL (ref 13.3–17.7)
HIV 1+2 AB+HIV1 P24 AG SERPL QL IA: NEGATIVE
MCH RBC QN AUTO: 29.7 PG (ref 26.5–33)
MCHC RBC AUTO-ENTMCNC: 32.6 G/DL (ref 31.5–36.5)
MCV RBC AUTO: 91 FL (ref 78–100)
PLATELET # BLD AUTO: 250 10E3/UL (ref 150–450)
POTASSIUM BLD-SCNC: 3.8 MMOL/L (ref 3.5–5)
PROT SERPL-MCNC: 7.4 G/DL (ref 6–8)
RBC # BLD AUTO: 4.24 10E6/UL (ref 4.4–5.9)
SODIUM SERPL-SCNC: 141 MMOL/L (ref 136–145)
WBC # BLD AUTO: 6.3 10E3/UL (ref 4–11)

## 2022-02-28 PROCEDURE — 85027 COMPLETE CBC AUTOMATED: CPT

## 2022-02-28 PROCEDURE — 36415 COLL VENOUS BLD VENIPUNCTURE: CPT

## 2022-02-28 PROCEDURE — 87389 HIV-1 AG W/HIV-1&-2 AB AG IA: CPT

## 2022-02-28 PROCEDURE — 80053 COMPREHEN METABOLIC PANEL: CPT

## 2022-02-28 PROCEDURE — 86803 HEPATITIS C AB TEST: CPT

## 2022-02-28 NOTE — TELEPHONE ENCOUNTER
Reason for Call:  Other call back    Detailed comments:   Patient's mother, Mildred, believes patient has a sinus infection. Reports sinus pain and congestion. She is requesting prescription amoxicillin. Patient last seen 01/28/2022. Mother declined appointment.     Phone Number Patient can be reached at: [phone]    Can we leave a detailed message on this number? YES    Call taken on 2/28/2022 at 1:23 PM by Desirae Sunshine

## 2022-02-28 NOTE — TELEPHONE ENCOUNTER
Nabil, Adrian CARMONA is out of the office until Wednesday.  I recommend a virtual visit with another provider in the meantime.

## 2022-03-01 ENCOUNTER — VIRTUAL VISIT (OUTPATIENT)
Dept: ONCOLOGY | Facility: HOSPITAL | Age: 27
End: 2022-03-01
Attending: FAMILY MEDICINE
Payer: COMMERCIAL

## 2022-03-01 ENCOUNTER — VIRTUAL VISIT (OUTPATIENT)
Dept: FAMILY MEDICINE | Facility: CLINIC | Age: 27
End: 2022-03-01
Payer: COMMERCIAL

## 2022-03-01 DIAGNOSIS — G40.909 NONINTRACTABLE EPILEPSY WITHOUT STATUS EPILEPTICUS, UNSPECIFIED EPILEPSY TYPE (H): ICD-10-CM

## 2022-03-01 DIAGNOSIS — J01.01 ACUTE RECURRENT MAXILLARY SINUSITIS: Primary | ICD-10-CM

## 2022-03-01 DIAGNOSIS — C91.02 ACUTE LYMPHOBLASTIC LEUKEMIA (ALL) IN RELAPSE (H): ICD-10-CM

## 2022-03-01 DIAGNOSIS — F84.0 ACTIVE AUTISTIC DISORDER: ICD-10-CM

## 2022-03-01 PROBLEM — Z62.820 PROBLEM BETWEEN PARENT AND CHILD: Status: ACTIVE | Noted: 2022-03-01

## 2022-03-01 PROBLEM — R46.89 AGGRESSIVE BEHAVIOR: Status: ACTIVE | Noted: 2022-03-01

## 2022-03-01 PROBLEM — N20.1 URETERAL STONE: Status: ACTIVE | Noted: 2021-11-08

## 2022-03-01 PROBLEM — S92.309A FRACTURE OF METATARSAL BONE: Status: ACTIVE | Noted: 2022-03-01

## 2022-03-01 PROBLEM — G93.41 METABOLIC ENCEPHALOPATHY: Status: ACTIVE | Noted: 2022-01-24

## 2022-03-01 PROBLEM — Z79.899 HIGH RISK MEDICATION USE: Status: ACTIVE | Noted: 2022-03-01

## 2022-03-01 PROBLEM — H66.90 ACUTE OTITIS MEDIA: Status: ACTIVE | Noted: 2019-10-31

## 2022-03-01 PROBLEM — R32 INCONTINENCE: Status: ACTIVE | Noted: 2022-03-01

## 2022-03-01 LAB — HCV AB SERPL QL IA: NONREACTIVE

## 2022-03-01 PROCEDURE — 99213 OFFICE O/P EST LOW 20 MIN: CPT | Mod: GT | Performed by: FAMILY MEDICINE

## 2022-03-01 PROCEDURE — 97802 MEDICAL NUTRITION INDIV IN: CPT | Mod: GT,95 | Performed by: DIETITIAN, REGISTERED

## 2022-03-01 RX ORDER — AMOXICILLIN 875 MG
875 TABLET ORAL 2 TIMES DAILY
Qty: 20 TABLET | Refills: 0 | Status: SHIPPED | OUTPATIENT
Start: 2022-03-01 | End: 2022-03-11

## 2022-03-01 RX ORDER — TOPIRAMATE 100 MG/1
TABLET, FILM COATED ORAL
COMMUNITY
Start: 2021-02-08 | End: 2022-03-01

## 2022-03-01 NOTE — TELEPHONE ENCOUNTER
Spoke with pt. Video visit scheduled for pt at 4:20 pm on Dr. Santoro's schedule today. Per pt send link to wife's number [phone] noted on the Apt note. xl

## 2022-03-01 NOTE — PROGRESS NOTES
Ramy is a 27 year old who is being evaluated via a billable video visit.      How would you like to obtain your AVS? MyChart  If the video visit is dropped, the invitation should be resent by: Text to cell phone: [phone]  Will anyone else be joining your video visit? Mom is with him     Video Start Time: 4:24 PM    Sinusitis  tx with amoxicillin.   Follow up if not better.   covid within past 90 days so covid reinfection unlikely.    Chief Complaint   Patient presents with     Sinus Problem        Subjective   Ramy is a 27 year old who presents for the following health issues sinusitis.   History:  Chronic sinusitis since age 5 and he gets increased frequency of drop seizures during sinus infections. Mom notes he is congested and mouth breathing a lot. She notices swelling of his left cheek whenever he gets this. They work with Dr. Summers ENT in the past and usually amoxicillin works for him.     Do you have a fever? No    Are you having difficulty breathing? No    Do you have a cough? No    What other symptoms have you experienced? Runny Nose and Blocked Sinuses    Do you have any of the following? Fatigue    Have you ever been diagnosed with asthma, bronchitis, or lung disease? No    Do you smoke? No     Are there people you know with similar symptoms? No    Have you recently been hospitalized? He was dx with covid the day before Thanksgiving.         Objective           Vitals:  No vitals were obtained today due to virtual visit.    Physical Exam   GENERAL: alert, no distress and mentally disabled, mom speaks for him.  EYES: Eyes grossly normal to inspection.  No discharge or erythema, or obvious scleral/conjunctival abnormalities.  RESP: No audible wheeze, cough, or visible cyanosis.  No visible retractions or increased work of breathing.    SKIN: Visible skin clear. No significant rash, abnormal pigmentation or lesions.  NEURO: Cranial nerves grossly intact.  Mentation and speech appropriate for  age.  PSYCH: Mentation appears normal, affect normal/bright, judgement and insight intact, normal speech and appearance well-groomed.          Video-Visit Details    Type of service:  Video Visit    Video End Time:4:37 PM    Originating Location (pt. Location): Home    Distant Location (provider location):  [facility]     Platform used for Video Visit: [employer]

## 2022-03-01 NOTE — PROGRESS NOTES
"CLINICAL NUTRITION SERVICES - ASSESSMENT NOTE    Ramy Beasley 27 year old referred for MNT related to ALL and tube feeding management    Time Spent: 15 minutes  Visit Type: initial video visit  Pt accompanied by: parents, Chavo and Mildred    NUTRITION HISTORY  Factors affecting nutrition intake include: none currently  Current diet/appetite: regular diet, good appetite, intake; supplementing oral intake with tube feeding  Chemotherapy: ongoing  Radiation: N/A    Diet Recall  Ramy had a GJ feeding tube placed on 1/21 (and replaced on 1/30) due to weight loss. Ramy has since regained his appetite and his weight. Parents reports he is eating normally. They are continuing to give 4 cartons of Isosource 1.5/ day (1500 calories and 68 g protein). Family reports the feeding tube is a significant burden as Ramy is pulling on it.    ANTHROPOMETRICS  Height:   Ht Readings from Last 1 Encounters:   01/28/22 1.905 m (6' 3\")     Weight: 168 lbs yesterday per report    BMI: 21 kg/m2  Weight Status:  Normal BMI  IBW: 196 lbs/ 89.1 kg (86%)  Weight History: Limited recent weights on file. Family reports historical baseline weight of 187-190 lbs.  Wt Readings from Last 5 Encounters:   01/28/22 66.7 kg (147 lb)   01/26/22 68 kg (150 lb)   08/10/16 81.1 kg (178 lb 11.2 oz) (80 %, Z= 0.85)*   10/15/12 88.5 kg (195 lb) (97 %, Z= 1.94)*   06/21/12 89.6 kg (197 lb 8.5 oz) (98 %, Z= 2.08)*     * Growth percentiles are based on CDC (Boys, 2-20 Years) data.       Dosing Weight: 76 kg (current weight)    Medications/vitamins/minerals/herbals:   Reviewed    Labs:   Labs reviewed    NUTRITION FOCUSED PHYSICAL ASSESSMENT FOR DIAGNOSING MALNUTRITION:  Not observed due to Covid 19 pandemic - no clinic contact    ASSESSED NUTRITION NEEDS:  Estimated Energy Needs: 7342-1797 kcals (30-35 Kcal/Kg)  Justification: to support ongoing gradual weight gain toward baseline weight  Estimated Protein Needs: 76-91 grams protein (1-1.2 g " pro/Kg)  Justification: Maintenance  Estimated Fluid Needs: 7656-3376 mL   Justification: maintenance    MALNUTRITION:  % Weight Loss:  Weight loss does not meet criteria for malnutrition (now gaining weight)  % Intake:  No decreased intake noted  Subcutaneous Fat Loss:  Unable to assess  Muscle Loss:  Unable to assess  Fluid Retention:  None noted    Malnutrition Diagnosis: Unable to determine due to unable to complete nutrition focused physical exame    NUTRITION DIAGNOSIS:  Predicted excessive nutrient intake related to combination of tube feeding and oral intake as evidenced by anticipated intake greater than needed to support weight gain.    INTERVENTIONS  Provided written & verbal education:     - Reviewed nutrition and hydration needs. Encouraged ongoing efforts to eat adequately. Encouraged foods that are preferred and emphasis on adequate calories and protein.  - Recommended holding tube feeding to ensure that Ramy is able to maintain his weight on oral intake alone. Encouraged continuing to flush feeding tube twice/ day.     Pt verbalize understanding of materials provided during consult.   Patient Understanding: Excellent  Expected patient engagement: Excellent     Implementation  Collaboration and Referral of Nutrition care- contact PCP to review nutrition recommendations.    Recommendations  Hold tube feeding. If able to maintain/ gain weight over the next week, okay to remove feeding tube.    Goals  1. Aim for 2280 kcal and 76 g protein/day  2. Ongoing gradual weight gain to baseline weight.    Follow-Up Plans: Pt has RD contact information for questions.      MONITORING AND EVALUATION:  -Food/beverage intake  -Weight trends      Ya Saucedo, MS, RD, LD

## 2022-03-02 ENCOUNTER — VIRTUAL VISIT (OUTPATIENT)
Dept: FAMILY MEDICINE | Facility: CLINIC | Age: 27
End: 2022-03-02
Payer: COMMERCIAL

## 2022-03-02 DIAGNOSIS — R63.30 FEEDING DIFFICULTIES: Primary | ICD-10-CM

## 2022-03-02 DIAGNOSIS — M87.00 AVASCULAR BONE NECROSIS (H): ICD-10-CM

## 2022-03-02 PROCEDURE — 99213 OFFICE O/P EST LOW 20 MIN: CPT | Mod: GT | Performed by: FAMILY MEDICINE

## 2022-03-02 RX ORDER — LEVETIRACETAM 750 MG/1
2 TABLET ORAL 2 TIMES DAILY
COMMUNITY
Start: 2022-03-01

## 2022-03-02 NOTE — PROGRESS NOTES
Ramy is a 27 year old who is being evaluated via a billable video visit.      How would you like to obtain your AVS? Mail a copy  If the video visit is dropped, the invitation should be resent by: Text to cell phone: [phone]  Will anyone else be joining your video visit? No      Video Start Time: 5:15 PM    Assessment & Plan   Problem List Items Addressed This Visit        Musculoskeletal and Integumentary    Avascular bone necrosis (H)     WillWith nutrition status back to normal and patient come back around to normal.  Need to involve orthopedics in the near future.  Discussed with mother of patient that this is most likely going to be bilateral hip replacements.  Also broached the idea of given his underlying autism and developmental delays, I do recommend that with each replacement that we engage a transitional care unit to help with increased physical and occupational therapy that would be needed after the surgery.  Mother patient in agreement and not against the idea.            Other    Feeding difficulties - Primary     G-tube feedings are now on hold.  If weight stays stable over the next week will have G-tube removed.                     Return in about 4 months (around 7/2/2022) for Routine preventive.    Adrian Ferrer DO  [facility]   Ramy is a 27 year old who presents for the following health issues  accompanied by his mother.    Follow-up on recent testings and recommendations from the nutritionist.  He is now back to full oral feedings and not using the G-tube.  Doing very well.  His normal mentation and activity level is returned to normal.  He still needs constant watching as he does try to pull the G-tube.  Of note the other finding while hospitalized is avascular necrosis bilaterally of the femoral heads.  Parents understand that this most likely will require hip replacement in the near future.  And now that his nutritional status is back we  will be engaging orthopedics in the near future.  There are work with his neurologist as well to possibly adjust his seizure medications now that his normal mentation as well as nutritional status has returned.         Review of Systems   All other systems reviewed and are negative.           Objective    Vitals - Patient Reported  Weight (Patient Reported): 76.2 kg (168 lb)        Physical Exam  Vitals and nursing note reviewed.   Constitutional:       General: He is not in acute distress.     Appearance: He is not ill-appearing.   HENT:      Head: Normocephalic and atraumatic.   Psychiatric:         Mood and Affect: Mood normal.                Video-Visit Details    Type of service:  Video Visit    Video End Time:5:26 PM    Originating Location (pt. Location): Home    Distant Location (provider location):  [facility]     Platform used for Video Visit: [email or web address]

## 2022-03-03 ENCOUNTER — PATIENT OUTREACH (OUTPATIENT)
Dept: UROLOGY | Facility: CLINIC | Age: 27
End: 2022-03-03
Payer: COMMERCIAL

## 2022-03-03 DIAGNOSIS — G90.9 AUTONOMIC NEUROPATHY: Primary | ICD-10-CM

## 2022-03-03 NOTE — ASSESSMENT & PLAN NOTE
G-tube feedings are now on hold.  If weight stays stable over the next week will have G-tube removed.     We will discuss with interventional radiology to determine if this can be done in clinic or should be done through their clinic.

## 2022-03-03 NOTE — TELEPHONE ENCOUNTER
Pt dad calls into clinic stating pt is scheduled for post op ultrasound today and video follow up in a week or so.    Pt dad states he is concerned during ultrasound, the pt g-tube will be exposed, leaving risk that the patient will pull the tube out.    Plan as of today is to pull the tube next week, if patient keeps weight on while feedings are paused. Pt dad would like to cancel today's imaging appt and wait to have tube pulled prior to rescheduling imaging and then subsequent follow up with urology.     Appts cancelled, will wait to hear back from family as to planning of tube removal dates

## 2022-03-03 NOTE — ASSESSMENT & PLAN NOTE
WillWith nutrition status back to normal and patient come back around to normal.  Need to involve orthopedics in the near future.  Discussed with mother of patient that this is most likely going to be bilateral hip replacements.  Also broached the idea of given his underlying autism and developmental delays, I do recommend that with each replacement that we engage a transitional care unit to help with increased physical and occupational therapy that would be needed after the surgery.  Mother patient in agreement and not against the idea.

## 2022-03-06 NOTE — TELEPHONE ENCOUNTER
Routing refill request to provider for review/approval because:  Drug not active on patient's medication list  Labs not current:  Lipid Panel.  Medication is reported/historical    Last Written Prescription Date:  ???  Last Fill Quantity: ???,  # refills: ???   Last office visit provider:  03/02/2022 with Dr Ferrer.     Requested Prescriptions   Pending Prescriptions Disp Refills     risperiDONE (RISPERDAL) 3 MG tablet [Pharmacy Med Name: RISPERIDONE 3MG TABLETS] 75 tablet      Sig: TAKE 1 TABLET BY MOUTH IN THE MORNING AND 1.5 TABLETS AT BEDTIME       Antipsychotic Medications Failed - 3/3/2022  3:42 PM        Failed - Lipid panel on file within the past 12 months     No lab results found.            Passed - Blood pressure under 140/90 in past 12 months     BP Readings from Last 3 Encounters:   01/28/22 110/82   01/26/22 112/70                 Passed - Patient is 12 years of age or older        Passed - CBC on file in past 12 months     Recent Labs   Lab Test 02/28/22  1321   WBC 6.3   RBC 4.24*   HGB 12.6*   HCT 38.6*                    Passed - Heart Rate on file within past 12 months     Pulse Readings from Last 3 Encounters:   01/28/22 88   01/26/22 84               Passed - A1c or Glucose on file in past 12 months     Recent Labs   Lab Test 02/28/22  1321   *       Please review patients last 3 weights. If a weight gain of >10 lbs exists, you may refill the prescription once after instructing the patient to schedule an appointment within the next 30 days.    Wt Readings from Last 3 Encounters:   01/28/22 66.7 kg (147 lb)   01/26/22 68 kg (150 lb)   08/10/16 81.1 kg (178 lb 11.2 oz) (80 %)*     * Growth percentiles are based on CDC (Boys, 2-20 Years) data.             Passed - Medication is active on med list        Passed - Recent (6 mo) or future (30 days) visit within the authorizing provider's specialty     Patient had office visit in the last 6 months or has a visit in the next 30 days with  "authorizing provider or within the authorizing provider's specialty.  See \"Patient Info\" tab in inbasket, or \"Choose Columns\" in Meds & Orders section of the refill encounter.                 Abbey Smith 03/05/22 9:53 PM  "

## 2022-03-07 ENCOUNTER — TELEPHONE (OUTPATIENT)
Dept: FAMILY MEDICINE | Facility: CLINIC | Age: 27
End: 2022-03-07
Payer: COMMERCIAL

## 2022-03-07 DIAGNOSIS — Z46.59 ENCOUNTER FOR CARE RELATED TO FEEDING TUBE: Primary | ICD-10-CM

## 2022-03-07 RX ORDER — RISPERIDONE 3 MG/1
TABLET ORAL
Qty: 75 TABLET | Refills: 3 | Status: SHIPPED | OUTPATIENT
Start: 2022-03-07 | End: 2022-06-08

## 2022-03-07 NOTE — TELEPHONE ENCOUNTER
Reason for Call:  Chavo is calling to follow up on tube feeding advice. Provider was going to let the family know where Ram[facility] could get the feeding tube taken out.    Detailed comments: please call them back to discuss.    Phone Number Patient can be reached at: Home number on file [phone] (home)    Best Time: any    Can we leave a detailed message on this number? YES    Call taken on 3/7/2022 at 11:17 AM by Maureen Smith

## 2022-03-09 ENCOUNTER — PATIENT OUTREACH (OUTPATIENT)
Dept: UROLOGY | Facility: CLINIC | Age: 27
End: 2022-03-09
Payer: COMMERCIAL

## 2022-03-09 NOTE — TELEPHONE ENCOUNTER
Pt dad called for update, looking to see if feeding tube will be removed so US can be completed. Chavo states he left another voicemail with care team, he will update urology again in the next day or so.    If tube is removed, plan will be to proceed with imaging and follow up with Dr. Casanova- if tube is to be delayed a bit, care plan may be adjusted, will wait for call back.

## 2022-03-11 ENCOUNTER — OFFICE VISIT (OUTPATIENT)
Dept: FAMILY MEDICINE | Facility: CLINIC | Age: 27
End: 2022-03-11
Payer: COMMERCIAL

## 2022-03-11 VITALS
WEIGHT: 173.6 LBS | DIASTOLIC BLOOD PRESSURE: 62 MMHG | SYSTOLIC BLOOD PRESSURE: 112 MMHG | TEMPERATURE: 97.3 F | HEART RATE: 68 BPM | BODY MASS INDEX: 21.7 KG/M2 | RESPIRATION RATE: 16 BRPM

## 2022-03-11 DIAGNOSIS — R63.30 FEEDING DIFFICULTIES: ICD-10-CM

## 2022-03-11 DIAGNOSIS — M87.00 AVASCULAR BONE NECROSIS (H): Primary | ICD-10-CM

## 2022-03-11 DIAGNOSIS — R62.50 DEVELOPMENTAL DELAY: ICD-10-CM

## 2022-03-11 PROCEDURE — 99214 OFFICE O/P EST MOD 30 MIN: CPT | Performed by: FAMILY MEDICINE

## 2022-03-11 RX ORDER — LAMOTRIGINE 100 MG/1
5 TABLET ORAL 2 TIMES DAILY
COMMUNITY
Start: 2022-03-03

## 2022-03-11 NOTE — PROGRESS NOTES
Problem List Items Addressed This Visit        Other    Feeding difficulties     G-tube removed in office today without complication.  Wound care discussed with parents.  Warning signs and symptoms of the wound is well as feeding difficulties discussed and what to do if they occur.             No follow-ups on file.    Madeleine Mccann is a 27 year old who presents for the following health issues  accompanied by his mother and father.    Patient presents for G-tube removal.  Has had in place for over 50 days.  Weight has rebounded and now up to 174.  Also patient has been able to take all nutrition orally and G-tube has not been needed for the past 2 weeks.    All previous notes including replacement and type a catheter were reviewed.       Review of Systems   All other systems reviewed and are negative.           Objective    /62 (BP Location: Left arm, Patient Position: Sitting, Cuff Size: Adult Large)   Pulse 68   Temp 97.3  F (36.3  C) (Axillary)   Resp 16   Wt 78.7 kg (173 lb 9.6 oz)   BMI 21.70 kg/m    Body mass index is 21.7 kg/m .  Physical Exam  Nursing note reviewed.   Constitutional:       General: He is not in acute distress.     Appearance: Normal appearance. He is not ill-appearing.   HENT:      Head: Normocephalic and atraumatic.   Eyes:      Extraocular Movements: Extraocular movements intact.      Conjunctiva/sclera: Conjunctivae normal.   Pulmonary:      Effort: Pulmonary effort is normal.   Abdominal:      Comments: G-tube in normal location.  It is a gastrojejunal tube.  10 mL of fluid was removed from the balloon port.  Tube removed without incidence and was fully intact with no broken or cracked parts.    Ostomy site was then covered with Vaseline and gauze pad followed by Tegaderm.   Psychiatric:         Attention and Perception: Attention normal.         Mood and Affect: Mood normal.         Speech: Speech normal.                    DME (Durable Medical Equipment) Orders and  "Documentation  No orders of the defined types were placed in this encounter.     The patient was assessed and it was determined the patient is in need of the following listed DME Supplies/Equipment. Please complete supporting documentation below to demonstrate medical necessity.      Hospital Bed/Accessories Documentation  Hospital bed is required for body positioning, to allow for safe transfers to wheelchair and standing and frequent changes in body position, not feasible in an ordinary bed     NOTE: Patient must have a \"Yes\" in one of the four following questions to qualify for a hospital bed.    1. Does the patient require positioning of the body in ways not feasible with an ordinary bed due to a medical condition that is expected to last at least 1 month? 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 Does the patient require, for the alleviation of pain, positioning of the body in ways not feasible with an ordinary bed? Yes (Please explain): Bilateral avascular necrosis     3. Does the patient require the head of the bed to be elevated more than 30 degrees most of the time due to congestive heart failure, chronic pulmonary disease, or aspiration? No    4. Does the patient require traction that can only be attached to a hospital bed? No    Additional Criteria:    Does the patient require frequent changes in body position and/or have an immediate need for change in body position? Yes - Patient qualifies for Semi Electric Bed     Trapeze Criteria:  (Patient must meet standard hospital bed criteria also)   1. Does patient need this device to sit up because of a respiratory condition, for change in body position for other medical reasons, or to get in or out of bed? No          "

## 2022-03-11 NOTE — ASSESSMENT & PLAN NOTE
G-tube removed in office today without complication.  Wound care discussed with parents.  Warning signs and symptoms of the wound is well as feeding difficulties discussed and what to do if they occur.

## 2022-03-14 ENCOUNTER — HOSPITAL ENCOUNTER (OUTPATIENT)
Dept: ULTRASOUND IMAGING | Facility: CLINIC | Age: 27
Discharge: HOME | End: 2022-03-14
Attending: UROLOGY | Admitting: UROLOGY
Payer: COMMERCIAL

## 2022-03-14 DIAGNOSIS — N20.0 KIDNEY STONE: ICD-10-CM

## 2022-03-14 PROCEDURE — 76770 US EXAM ABDO BACK WALL COMP: CPT

## 2022-03-15 ENCOUNTER — VIRTUAL VISIT (OUTPATIENT)
Dept: UROLOGY | Facility: CLINIC | Age: 27
End: 2022-03-15
Payer: COMMERCIAL

## 2022-03-15 ENCOUNTER — DOCUMENTATION ONLY (OUTPATIENT)
Dept: OTHER | Facility: CLINIC | Age: 27
End: 2022-03-15

## 2022-03-15 DIAGNOSIS — N20.0 KIDNEY STONE: Primary | ICD-10-CM

## 2022-03-15 PROCEDURE — 99213 OFFICE O/P EST LOW 20 MIN: CPT | Mod: GT | Performed by: UROLOGY

## 2022-03-15 NOTE — LETTER
3/15/2022       RE: Ramy Beasley  [address] [address]     Dear Colleague,    Thank you for referring your patient, Ramy [facility]asley, to the [facility]OLOGY [facility] [place] [facility]. Please see a copy of my visit note below.    Ramy is a 27 year old who is being evaluated via a billable video visit.               UROLOGY OUTPATIENT VISIT      Chief Complaint:   Kidney Stones      Synopsis    Ramy Beasley is a very pleasant AGE: 27 year old year old person    26 year old male with a complex PMH, including epilepsy, active autistic disorder, acute lymphoblastic leukemia in remission, mild intellectual disability, hyperlipidemia and autonomic neuropathy who was found to have a 6 mm stone at his left UPJ.  He is now s/p L URS 1/26/22.  Stent has been removed.  HAd candida in urine and stone since treated.    Per his father he is doing much better.  Stone was mainly CaP and attributed to topamax use which he is currently in the process of weaning.  He is not having any stone or urinary symptoms.  They have increased fluids through g-tube.    Renal US yesterday without apparent stones or hydro    Medical Comorbidities      Past Medical History:   Diagnosis Date     Acute lymphoblastic leukemia (ALL) (H)      Acute Otitis Media     Created by Conversion Replacement Utility updated for latest IMO load      Allergic rhinitis     Created by Conversion [facility] Annotation: Oct  1 2009 12:46PM - Roosevelt Monsivais: RAST positive  to  trees, grasses, molds 11/2003 Replacement Utility updated for latest IMO load      Autistic Disorder      Diarrhea     Created by Conversion      Epilepsy And Recurrent Seizures      Otitis Externa     Created by Conversion Replacement Utility updated for latest IMO load      Ureteral stone 01/14/2022               Medications     Current Outpatient Medications   Medication     cholecalciferol 50 MCG (2000 UT)  CAPS     clonazePAM (KLONOPIN) 0.5 MG tablet     diazepam (DIASTAT) 20 MG GEL rectal gel     gabapentin (NEURONTIN) 100 MG capsule     gabapentin (NEURONTIN) 400 MG capsule     lamoTRIgine (LAMICTAL) 100 MG tablet     levETIRAcetam (KEPPRA) 750 MG tablet     loratadine (CLARITIN) 10 MG tablet     medical cannabis (Patient's own supply)     MELATONIN PO     Multiple vitamin TABS     Omega-3 Fatty Acids (FISH OIL) 1200 MG capsule     OXcarbazepine (TRILEPTAL) 150 MG tablet     OXcarbazepine (TRILEPTAL) 600 MG tablet     polyethylene glycol (MIRALAX) 17 gram packet     QUEtiapine (SEROQUEL) 300 MG tablet     risperiDONE (RISPERDAL) 3 MG tablet     senna-docusate (SENOKOT-S/PERICOLACE) 8.6-50 MG tablet     topiramate (TOPAMAX) 100 MG tablet     No current facility-administered medications for this visit.         The following  distinct labs were reviewed    I personally reviewed all applicable laboratory data and went over findings with patient  Significant for:    CBC RESULTS:  Recent Labs   Lab Test 02/28/22  1321   WBC 6.3   HGB 12.6*           BMP RESULTS:  Recent Labs   Lab Test 02/28/22  1321      POTASSIUM 3.8   CHLORIDE 106   CO2 22   ANIONGAP 13   *   BUN 24*   CR 0.87   GFRESTIMATED >90       CALCIUM RESULTS:  Recent Labs   Lab Test 02/28/22  1321   GERARDO 9.2     UA RESULTS:   Recent Labs   Lab Test 01/18/22  1328   SG 1.020   URINEPH 6.0   NITRITE Negative   RBCU 22*   WBCU 26*         Recent Imaging Report    I personally reviewed all applicable imaging and went over the below findings with patient.    Results for orders placed or performed during the hospital encounter of 03/14/22   US Renal Complete    Narrative    EXAM: US RENAL COMPLETE  LOCATION: [facility]  DATE/TIME: 3/14/2022 1:22 PM    INDICATION: History of kidney stones, follow-up exam  COMPARISON: CT chest, abdomen, and pelvis from 12/31/2021  TECHNIQUE: Routine Bilateral Renal and Bladder  Ultrasound.    FINDINGS:    RIGHT KIDNEY: 10.2 cm. Normal without hydronephrosis or masses.     LEFT KIDNEY: 12.4 cm. Normal without hydronephrosis or masses.     BLADDER: Normal.    Hepatic steatosis.      Impression    IMPRESSION:  1.  No convincing sonographic evidence of genitourinary calculi.  2.  No hydronephrosis.              Assessment/Plan   27 year old year old person with hx of kidney stones  -Wean topamax, suspect this is the underlying reason he had a stone  -KUB in one year    CC:  Adrian Ferrer O    >!5 minutes spent exclusive to any listed procedure on the clinical encounter date doing chart review, history and exam, documentation and further activities as noted above            How would you like to obtain your AVS? Mail a copy  If the video visit is dropped, the invitation should be resent by: Text to cell phone: [phone]  Will anyone else be joining your video visit? No      Video Start Time: 8:57  Video-Visit Details    Type of service:  Video Visit    Video End Time:9:08    Originating Location (pt. Location): [place]    Distant Location (provider location):  [facility] UROLOGY CLINIC [place]     Platform used for Video Visit: Nabor    Again, thank you for allowing me to participate in the care of your patient.      Sincerely,    Nakul Casanova MD

## 2022-03-15 NOTE — PROGRESS NOTES
Ramy is a 27 year old who is being evaluated via a billable video visit.               UROLOGY OUTPATIENT VISIT      Chief Complaint:   Kidney Stones      Synopsis    Ramy Beasley is a very pleasant AGE: 27 year old year old person    26 year old male with a complex PMH, including epilepsy, active autistic disorder, acute lymphoblastic leukemia in remission, mild intellectual disability, hyperlipidemia and autonomic neuropathy who was found to have a 6 mm stone at his left UPJ.  He is now s/p L URS 1/26/22.  Stent has been removed.  HAd candida in urine and stone since treated.    Per his father he is doing much better.  Stone was mainly CaP and attributed to topamax use which he is currently in the process of weaning.  He is not having any stone or urinary symptoms.  They have increased fluids through g-tube.    Renal US yesterday without apparent stones or hydro    Medical Comorbidities      Past Medical History:   Diagnosis Date     Acute lymphoblastic leukemia (ALL) (H)      Acute Otitis Media     Created by Conversion Replacement Utility updated for latest IMO load      Allergic rhinitis     Created by Conversion [facility] Annotation: Oct  1 2009 12:46PM - Roosevelt Monsivais: RAST positive  to  trees, grasses, molds 11/2003 Replacement Utility updated for latest IMO load      Autistic Disorder      Diarrhea     Created by Conversion      Epilepsy And Recurrent Seizures      Otitis Externa     Created by Conversion Replacement Utility updated for latest IMO load      Ureteral stone 01/14/2022               Medications     Current Outpatient Medications   Medication     cholecalciferol 50 MCG (2000 UT) CAPS     clonazePAM (KLONOPIN) 0.5 MG tablet     diazepam (DIASTAT) 20 MG GEL rectal gel     gabapentin (NEURONTIN) 100 MG capsule     gabapentin (NEURONTIN) 400 MG capsule     lamoTRIgine (LAMICTAL) 100 MG tablet     levETIRAcetam (KEPPRA) 750 MG tablet     loratadine (CLARITIN) 10 MG tablet     medical cannabis  (Patient's own supply)     MELATONIN PO     Multiple vitamin TABS     Omega-3 Fatty Acids (FISH OIL) 1200 MG capsule     OXcarbazepine (TRILEPTAL) 150 MG tablet     OXcarbazepine (TRILEPTAL) 600 MG tablet     polyethylene glycol (MIRALAX) 17 gram packet     QUEtiapine (SEROQUEL) 300 MG tablet     risperiDONE (RISPERDAL) 3 MG tablet     senna-docusate (SENOKOT-S/PERICOLACE) 8.6-50 MG tablet     topiramate (TOPAMAX) 100 MG tablet     No current facility-administered medications for this visit.         The following  distinct labs were reviewed    I personally reviewed all applicable laboratory data and went over findings with patient  Significant for:    CBC RESULTS:  Recent Labs   Lab Test 02/28/22  1321   WBC 6.3   HGB 12.6*           BMP RESULTS:  Recent Labs   Lab Test 02/28/22  1321      POTASSIUM 3.8   CHLORIDE 106   CO2 22   ANIONGAP 13   *   BUN 24*   CR 0.87   GFRESTIMATED >90       CALCIUM RESULTS:  Recent Labs   Lab Test 02/28/22  1321   GERARDO 9.2     UA RESULTS:   Recent Labs   Lab Test 01/18/22  1328   SG 1.020   URINEPH 6.0   NITRITE Negative   RBCU 22*   WBCU 26*         Recent Imaging Report    I personally reviewed all applicable imaging and went over the below findings with patient.    Results for orders placed or performed during the hospital encounter of 03/14/22   US Renal Complete    Narrative    EXAM: US RENAL COMPLETE  LOCATION: [facility]  DATE/TIME: 3/14/2022 1:22 PM    INDICATION: History of kidney stones, follow-up exam  COMPARISON: CT chest, abdomen, and pelvis from 12/31/2021  TECHNIQUE: Routine Bilateral Renal and Bladder Ultrasound.    FINDINGS:    RIGHT KIDNEY: 10.2 cm. Normal without hydronephrosis or masses.     LEFT KIDNEY: 12.4 cm. Normal without hydronephrosis or masses.     BLADDER: Normal.    Hepatic steatosis.      Impression    IMPRESSION:  1.  No convincing sonographic evidence of genitourinary calculi.  2.  No hydronephrosis.               Assessment/Plan   27 year old year old person with hx of kidney stones  -Wean topamax, suspect this is the underlying reason he had a stone  -KUB in one year    CC:  Adrian Ferrer O    >!5 minutes spent exclusive to any listed procedure on the clinical encounter date doing chart review, history and exam, documentation and further activities as noted above            How would you like to obtain your AVS? Mail a copy  If the video visit is dropped, the invitation should be resent by: Text to cell phone: [phone]  Will anyone else be joining your video visit? No      Video Start Time: 8:57  Video-Visit Details    Type of service:  Video Visit    Video End Time:9:08    Originating Location (pt. Location): [place]    Distant Location (provider location):  [facility] UROLOGY CLINIC [place]     Platform used for Video Visit: Nervogrid

## 2022-03-17 ENCOUNTER — PATIENT OUTREACH (OUTPATIENT)
Dept: NURSING | Facility: CLINIC | Age: 27
End: 2022-03-17
Payer: COMMERCIAL

## 2022-03-17 ENCOUNTER — TELEPHONE (OUTPATIENT)
Dept: UROLOGY | Facility: CLINIC | Age: 27
End: 2022-03-17

## 2022-03-17 ENCOUNTER — TRANSFERRED RECORDS (OUTPATIENT)
Dept: HEALTH INFORMATION MANAGEMENT | Facility: CLINIC | Age: 27
End: 2022-03-17

## 2022-03-17 NOTE — PROGRESS NOTES
Clinic Care Coordination Contact    Community Health Worker Follow Up    Care Gaps:     Health Maintenance Due   Topic Date Due     PREVENTIVE CARE VISIT  Never done     Pneumococcal Vaccine: Pediatrics (0 to 5 Years) and At-Risk Patients (6 to 64 Years) (2 of 4 - PPSV23) 03/25/2022       Postponed to next CHW outreach     Goals: Not discussed    ** CHW called patients father Chavo. Chavo was busy and asked to call CHW back.     CHW Plan: CHW will await phone call from patients dad Chavo or call him back in 10 business days.     Evelyn Demarco  Community Health Worker  [facility]  Clinic Care Coordination   Office: [phone]

## 2022-03-22 ENCOUNTER — PATIENT OUTREACH (OUTPATIENT)
Dept: UROLOGY | Facility: CLINIC | Age: 27
End: 2022-03-22
Payer: COMMERCIAL

## 2022-03-22 DIAGNOSIS — N48.89 PENILE IRRITATION: Primary | ICD-10-CM

## 2022-03-22 RX ORDER — CLOTRIMAZOLE 1 %
CREAM (GRAM) TOPICAL 2 TIMES DAILY
Qty: 15 G | Refills: 0 | Status: SHIPPED | OUTPATIENT
Start: 2022-03-22

## 2022-03-22 NOTE — TELEPHONE ENCOUNTER
Redness and itching over full head of penis began a day or two ago. Pt seems irrigated with it and scratches tip of penis. No bumps or discharge. Pt dad asking for something to relieve itching. Yeast seen in urine sample, note sent to provider for advisement, and cream sent to pharmacy.

## 2022-03-23 ENCOUNTER — PATIENT OUTREACH (OUTPATIENT)
Dept: UROLOGY | Facility: CLINIC | Age: 27
End: 2022-03-23
Payer: COMMERCIAL

## 2022-03-23 ENCOUNTER — PATIENT OUTREACH (OUTPATIENT)
Dept: CARE COORDINATION | Facility: CLINIC | Age: 27
End: 2022-03-23
Payer: COMMERCIAL

## 2022-03-23 NOTE — PROGRESS NOTES
Clinic Care Coordination Contact    Care Coordination Clinician Chart Review     Situation: Patient chart reviewed by care coordinator.?     Background: Initial assessment and enrollment to Care Coordination was 2/16/22.?? Patient centered goals were developed with participation from patient.? Lead CC handed patient off to CHW for continued outreach every 30 days.??     Assessment: Per chart review, patient outreach completed by CC CHW on 3/17/22.? Patient is actively working to accomplish goal(s).? Patient's goal(s) remain(s) appropriate at this time.? Patient is not due for updated Plan of Care.? Annual assessment will be due 2/16/22.     Goals        Other (pt-stated)       # 1 Goal Statement: I would like to speak with Dr. Ferrer regarding obtaining a hospital bed in the next 6 months.  Date Goal set: 2/16/22  Barriers:   Strengths: motivated  Date to Achieve By:   Patient expressed understanding of goal: spoke with riki Tony  Action steps to achieve this goal:  1. I will speak with Dr. Ferrer regarding a hospital bed.  2. Any additional questions, paperwork, etc can go through [facility] .          ??     Plan/Recommendations: The patient will continue working with Care Coordination to achieve above goal(s).? CHW will involve Lead CC as needed or if patient is ready to move to maintenance.? Lead CC will continue to monitor CHW s monthly outreaches and progress to goal(s) every 6 weeks.?     Plan of Care updated and sent to patient: No

## 2022-03-23 NOTE — TELEPHONE ENCOUNTER
Pt dad states pt has been experiencing frequent erections the last couple of days and his dad is concerned it may be a response to cipro. Pt dad states pt has not had cipro before or frequent erections before, asking if there may be a correlation. Chavo reassured it is likely from frequent stimulation and rubbing with irritated penis rather than abx.     Chavo sates reassurance. He will finish 5 days of cipro course, he has one more day to go.    Pt dad will call in tomorrow with update about penile irritation, as he said it is still bright red. He is using cream they picked up yesterday

## 2022-03-23 NOTE — TELEPHONE ENCOUNTER
Per Dr. Julian, can alternate with A and D with zinc and lotrimin to see if that helps. If not, we do not have a urologist that can evaluate this week or next, follow with pcp is persists.    Chavo agreeable to updated plan

## 2022-04-11 ENCOUNTER — PATIENT OUTREACH (OUTPATIENT)
Dept: NURSING | Facility: CLINIC | Age: 27
End: 2022-04-11
Payer: COMMERCIAL

## 2022-04-11 NOTE — PROGRESS NOTES
Clinic Care Coordination Contact    Community Health Worker Follow Up    Care Gaps:     Health Maintenance Due   Topic Date Due     PREVENTIVE CARE VISIT  Never done     Pneumococcal Vaccine: Pediatrics (0 to 5 Years) and At-Risk Patients (6 to 64 Years) (2 of 4 - PPSV23) 03/25/2022       Postponed to next CHW outreach date     Goals:    Goals Addressed as of 4/11/2022 at 1:03 PM                    Today       Other (pt-stated)   20%    Added 2/21/22 by Alida De La Garza, RN      # 1 Goal Statement: I would like to speak with Dr. Ferrer regarding obtaining a hospital bed in the next 6 months.  Date Goal set: 2/16/22  Barriers:   Strengths: motivated  Date to Achieve By:   Patient expressed understanding of goal: spoke with dad Chavo  Action steps to achieve this goal:  1. I will speak with Dr. Ferrer regarding a hospital bed. I Chavo inquired with oTva CHW regarding order and his CHW advised that I  order and face sheet from PCP office and bring to medical supply store.   2. Any additional questions, paperwork, etc can go through [facility] . Continuous ([place])    Goal Updated: 4/11/22                  Intervention and Education during outreach:  Grag patients dad inquired about the hospitalbed order and  CHW advised that order to be picked up order and face sheet from PCP office and bring to medical supply store.     Patient father also asked for CHW to email him CHW contact info. CHW emailed contact infor to Chavo at [email or web address].    CHW Plan: CHW will follow up with patients father Chavo in 1 month on 5/11/22.    Evelyn Demarco  Community Health Worker  [facility]  Clinic Care Coordination   Office: [phone]

## 2022-04-13 ENCOUNTER — OFFICE VISIT (OUTPATIENT)
Dept: FAMILY MEDICINE | Facility: CLINIC | Age: 27
End: 2022-04-13
Payer: COMMERCIAL

## 2022-04-13 VITALS
HEIGHT: 75 IN | BODY MASS INDEX: 21.64 KG/M2 | TEMPERATURE: 100.3 F | HEART RATE: 104 BPM | DIASTOLIC BLOOD PRESSURE: 64 MMHG | WEIGHT: 174 LBS | RESPIRATION RATE: 12 BRPM | SYSTOLIC BLOOD PRESSURE: 102 MMHG

## 2022-04-13 DIAGNOSIS — H10.33 ACUTE CONJUNCTIVITIS OF BOTH EYES, UNSPECIFIED ACUTE CONJUNCTIVITIS TYPE: ICD-10-CM

## 2022-04-13 DIAGNOSIS — R50.9 FEVER, UNSPECIFIED FEVER CAUSE: Primary | ICD-10-CM

## 2022-04-13 LAB
FLUAV AG SPEC QL IA: NEGATIVE
FLUBV AG SPEC QL IA: NEGATIVE

## 2022-04-13 PROCEDURE — 99213 OFFICE O/P EST LOW 20 MIN: CPT | Performed by: FAMILY MEDICINE

## 2022-04-13 PROCEDURE — 87804 INFLUENZA ASSAY W/OPTIC: CPT | Performed by: FAMILY MEDICINE

## 2022-04-13 RX ORDER — POLYMYXIN B SULFATE AND TRIMETHOPRIM 1; 10000 MG/ML; [USP'U]/ML
1-2 SOLUTION OPHTHALMIC 3 TIMES DAILY
Qty: 10 ML | Refills: 0 | Status: SHIPPED | OUTPATIENT
Start: 2022-04-13 | End: 2022-04-18

## 2022-04-13 RX ORDER — CEPHALEXIN 500 MG/1
500 CAPSULE ORAL 2 TIMES DAILY
Qty: 14 CAPSULE | Refills: 0 | Status: SHIPPED | OUTPATIENT
Start: 2022-04-13 | End: 2022-04-20

## 2022-04-13 ASSESSMENT — ENCOUNTER SYMPTOMS: FEVER: 1

## 2022-04-13 NOTE — PROGRESS NOTES
Problem List Items Addressed This Visit    None     Visit Diagnoses     Fever, unspecified fever cause    -  Primary    Given his history of recent UTI, will retreat given the negative influenza test.  Continue symptomatic care    Relevant Medications    cephALEXin (KEFLEX) 500 MG capsule    Other Relevant Orders    Influenza A/B antigen (Completed)    Acute conjunctivitis of both eyes, unspecified acute conjunctivitis type        Possibility of viral but given the wrist eyesight will treat for possible bacterial    Relevant Medications    trimethoprim-polymyxin b (POLYTRIM) 15535-3.1 UNIT/ML-% ophthalmic solution        Return in about 3 months (around 7/2/2022) for Routine preventive.    Madeleine Mccann is a 27 year old who presents for the following health issues  accompanied by his father.    Fever and slight cough for the past 24 hours.  Similar infection has been going around the household and most people is taking 5 to 7 days before improving.  Fever up to 102 this morning.  Tylenol has brought his temperature back down for the past 6 hours but now starting to rise again.  Normal appetite.  Increased fatigue.  Reviewing records he was recently treated for UTI.  Actually completed 3 of the 10 days.  Also getting pinkeye this morning.  No discharge or matting.  Does tend to rub his eyes though.    Fever  Associated symptoms include a fever.   History of Present Illness       Reason for visit:  High temp .feeling bad  Symptom onset:  Today  Symptom intensity:  Severe  Symptom progression:  Staying the same  Had these symptoms before:  No  What makes it worse:  No  What makes it better:  NO    He eats 0-1 servings of fruits and vegetables daily.He consumes 0 sweetened beverage(s) daily.He exercises with enough effort to increase his heart rate 9 or less minutes per day.  He exercises with enough effort to increase his heart rate 3 or less days per week.   He is taking medications regularly.       Review of  "Systems   Constitutional: Positive for fever.   All other systems reviewed and are negative.           Objective    /64 (BP Location: Right arm, Patient Position: Sitting, Cuff Size: Adult Large)   Pulse 104   Temp 100.3  F (37.9  C) (Axillary)   Resp 12   Ht 1.905 m (6' 3\")   Wt 78.9 kg (174 lb)   BMI 21.75 kg/m    Body mass index is 21.75 kg/m .  Physical Exam  Vitals and nursing note reviewed.   Constitutional:       General: He is not in acute distress.     Appearance: Normal appearance. He is not ill-appearing.   HENT:      Head: Normocephalic and atraumatic.   Eyes:      Extraocular Movements: Extraocular movements intact.      Conjunctiva/sclera: Conjunctivae normal.   Pulmonary:      Effort: Pulmonary effort is normal.   Neurological:      Mental Status: Mental status is at baseline.   Psychiatric:         Attention and Perception: Attention normal.         Mood and Affect: Mood normal.         Speech: Speech normal.         Thought Content: Thought content normal.                  "

## 2022-05-10 ENCOUNTER — PATIENT OUTREACH (OUTPATIENT)
Dept: CARE COORDINATION | Facility: CLINIC | Age: 27
End: 2022-05-10
Payer: COMMERCIAL

## 2022-05-10 NOTE — PROGRESS NOTES
Clinic Care Coordination Contact    Care Coordination Clinician Chart Review     Situation: Patient chart reviewed by care coordinator.?     Background: Initial assessment and enrollment to Care Coordination was 2/16/22.?? Patient centered goals were developed with participation from patient.? Lead CC handed patient off to CHW for continued outreach every 30 days.??     Assessment: Per chart review, patient outreach completed by CC CHW on 4/11/22.? Patient is actively working to accomplish goal(s).? Patient's goal(s) remain(s) appropriate at this time.? Patient is due for updated Plan of Care.? Annual assessment will be due 2/2023.      Goals        Other (pt-stated)       # 1 Goal Statement: I would like to speak with Dr. Ferrer regarding obtaining a hospital bed in the next 6 months.  Date Goal set: 2/16/22  Barriers:   Strengths: motivated  Date to Achieve By:   Patient expressed understanding of goal: spoke with riki Tony  Action steps to achieve this goal:  1. I will speak with Dr. Ferrer regarding a hospital bed. I Chavo inquired with [facility] CHW regarding order and his CHW advised that I  order and face sheet from PCP office and bring to medical supply store.   2. Any additional questions, paperwork, etc can go through [facility] . Continuous (MB)    Goal Updated: 4/11/22              ??     Plan/Recommendations: The patient will continue working with Care Coordination to achieve above goal(s).? CHW will involve Lead CC as needed or if patient is ready to move to maintenance.? Lead CC will continue to monitor CHW s monthly outreaches and progress to goal(s) every 6 weeks.?     Plan of Care updated and sent to patient: Yes, 5/10/22

## 2022-05-16 ENCOUNTER — PATIENT OUTREACH (OUTPATIENT)
Dept: NURSING | Facility: CLINIC | Age: 27
End: 2022-05-16
Payer: COMMERCIAL

## 2022-05-16 NOTE — PROGRESS NOTES
Clinic Care Coordination Contact    Community Health Worker Follow Up    Care Gaps:     Health Maintenance Due   Topic Date Due     PREVENTIVE CARE VISIT  Never done     HPV IMMUNIZATION (1 - Male 2-dose series) Never done       [facility]     Goals:    Goals Addressed as of 5/16/2022 at 3:32 PM                    Today    4/11/22       Other (pt-stated)   60%  20%    Added 2/21/22 by Alida De La Garza, RN      # 1 Goal Statement: I would like to speak with Dr. Ferrer regarding obtaining a hospital bed in the next 6 months.  Date Goal set: 2/16/22  Barriers:   Strengths: motivated  Date to Achieve By:   Patient expressed understanding of goal: spoke with riki Tony  Action steps to achieve this goal:  1. I will speak with Dr. Ferrer regarding a hospital bed. ANTONIA Tony  order and face sheet from PCP office and brought it to medical supply store. Ramy was approved for a rental hospital bed; ANTONIA Tony am going to call insurance company [employer] to try work that out with them.  2. Any additional questions, paperwork, etc can go through [facility] . Continuous (MB)    Goal Updated: 5/16/22                  Intervention and Education during outreach: N/A    CHW Plan: CHW will follow up with patients father in 1 month on 6/17/22.    Evelyn Demarco  Community Health Worker  [facility]  Clinic Care Coordination   Office: [phone]

## 2022-05-25 ENCOUNTER — NURSE TRIAGE (OUTPATIENT)
Dept: NURSING | Facility: CLINIC | Age: 27
End: 2022-05-25
Payer: COMMERCIAL

## 2022-05-25 NOTE — TELEPHONE ENCOUNTER
Called MOM and related MSG from Dr. Ferrer (see notes below)   She has agreed to take son to the ER for evaluation.  Shakira Guerrero RN [place] Nurse Advisor,  4:25 PM 5/25/2022

## 2022-05-25 NOTE — TELEPHONE ENCOUNTER
"TRIAGE CALL     MOM (guardian) calling VOMITING  Autism and epilepsy  Anti-seizure medication - Has missed his last doses;  both med last night and this AM  Started yesterday AM  Vomiting X 10 TODAY   Has not been drinking - everything is been trough up   Last wet dipper this AM   She has seen \"trace of urine in the last diaper  Informed caller that RN will send a note to PCP/Provider s Care Team   Pt s PCP/Clinic: Adrain Ferrer, DO - [facility]    Per protocol, disposition to 2LT    RN seeking advice from License practitioner to recommended a safe alternative plan for patient - Note sent 2LT    Shakira Guerrero RN [place] Nurse Advisor,  3:44 PM 5/25/2022    Reason for Disposition    [1] SEVERE vomiting (e.g., 6 or more times/day) AND [2] present > 8 hours    Protocols used: VOMITING-A-AH      "

## 2022-05-25 NOTE — TELEPHONE ENCOUNTER
With urine decreasing, and vomitting, worried for dehydration and possible kidney infection. Recommend ER for evaluation as well as possible fluid hydration.

## 2022-06-07 DIAGNOSIS — G90.9 AUTONOMIC NEUROPATHY: ICD-10-CM

## 2022-06-07 NOTE — TELEPHONE ENCOUNTER
"Routing refill request to provider for review/approval because:  Labs not current:  No lipid panel on file in last 12mo.    ZERO refills remain on this prescription. Your patient is requesting advance approval of refills for this medication to PREVENT ANY MISSED DOSES    Requested Prescriptions   Pending Prescriptions Disp Refills     risperiDONE (RISPERDAL) 3 MG tablet [Pharmacy Med Name: RISPERIDONE 3MG TABLETS] 75 tablet 3     Sig: TAKE 1 TABLET BY MOUTH IN THE MORNING AND 1.5 TABLETS AT BEDTIME       Antipsychotic Medications Failed - 6/7/2022  8:01 AM        Failed - Lipid panel on file within the past 12 months     No lab results found.            Passed - Blood pressure under 140/90 in past 12 months     BP Readings from Last 3 Encounters:   04/13/22 102/64   03/11/22 112/62   01/28/22 110/82                 Passed - Patient is 12 years of age or older        Passed - CBC on file in past 12 months     Recent Labs   Lab Test 02/28/22  1321   WBC 6.3   RBC 4.24*   HGB 12.6*   HCT 38.6*                    Passed - Heart Rate on file within past 12 months     Pulse Readings from Last 3 Encounters:   04/13/22 104   03/11/22 68   01/28/22 88               Passed - A1c or Glucose on file in past 12 months     Recent Labs   Lab Test 02/28/22  1321   *       Please review patients last 3 weights. If a weight gain of >10 lbs exists, you may refill the prescription once after instructing the patient to schedule an appointment within the next 30 days.    Wt Readings from Last 3 Encounters:   04/13/22 78.9 kg (174 lb)   03/11/22 78.7 kg (173 lb 9.6 oz)   01/28/22 66.7 kg (147 lb)             Passed - Medication is active on med list        Passed - Recent (6 mo) or future (30 days) visit within the authorizing provider's specialty     Patient had office visit in the last 6 months or has a visit in the next 30 days with authorizing provider or within the authorizing provider's specialty.  See \"Patient Info\" " "tab in inbasket, or \"Choose Columns\" in Meds & Orders section of the refill encounter.               Jorje Jung RN  [facility]     "

## 2022-06-08 RX ORDER — RISPERIDONE 3 MG/1
TABLET ORAL
Qty: 75 TABLET | Refills: 3 | Status: SHIPPED | OUTPATIENT
Start: 2022-06-08 | End: 2022-10-30

## 2022-06-20 ENCOUNTER — PATIENT OUTREACH (OUTPATIENT)
Dept: CARE COORDINATION | Facility: CLINIC | Age: 27
End: 2022-06-20
Payer: COMMERCIAL

## 2022-06-20 NOTE — PROGRESS NOTES
Clinic Care Coordination Contact    Community Health Worker Follow Up    Care Gaps:     Health Maintenance Due   Topic Date Due     PREVENTIVE CARE VISIT  Never done     HPV IMMUNIZATION (1 - Male 2-dose series) Never done       Postponed to next CHW outreach     Goals: CHW called and briefly spoke with patients father Harvey Tony was busy and said he would call CHW back.     Intervention and Education during outreach: N/A    CHW Plan: CHW will await patients riki Morfin phone call or call patients riki Tony back in 2 weeks on 7/4/22.     Evelyn Demarco  Community Health Worker  [facility]  Clinic Care Coordination   Office: [phone]

## 2022-06-24 ENCOUNTER — PATIENT OUTREACH (OUTPATIENT)
Dept: CARE COORDINATION | Facility: CLINIC | Age: 27
End: 2022-06-24

## 2022-06-24 NOTE — PROGRESS NOTES
Clinic Care Coordination Contact    Care Coordination Clinician Chart Review     Situation: Patient chart reviewed by care coordinator.?     Background: Initial assessment and enrollment to Care Coordination was 2/16/2022.?? Patient centered goals were developed with participation from patient.? Lead CC handed patient off to CHW for continued outreach every 30 days.??     Assessment: Per chart review, patient outreach completed by CC CHW on 6/20/22.? Patient has completed all Goals.  At next outreach will establish additional Goals or transition to Maintenance. Annual assessment will be due 2/16/2023.      Goals    None     ??     Plan/Recommendations: The patient will continue working with Care Coordination to achieve above goal(s).? CHW will involve Lead CC as needed or if patient is ready to move to maintenance.? Lead CC will continue to monitor CHW s monthly outreaches and progress to goal(s) every 6 weeks.?     Plan of Care updated and sent to patient: No     Methotrexate Pregnancy And Lactation Text: This medication is Pregnancy Category X and is known to cause fetal harm. This medication is excreted in breast milk.

## 2022-07-11 ENCOUNTER — PATIENT OUTREACH (OUTPATIENT)
Dept: CARE COORDINATION | Facility: CLINIC | Age: 27
End: 2022-07-11

## 2022-07-11 NOTE — PROGRESS NOTES
Clinic Care Coordination Contact    Situation-Received a request from CHW for patient to transition to [facility] Maintenance.    Background- Patient enrolled into [facility] 2/16/22.  Patient's father seeking assistance with navigating care related to Ramy's feeding tube, obtaining a hospital bed, and scanning Legal Guardianship paperwork into the computer.    Action-Chart review. All goals completed.  No new goals identified.      Response: Patient to transition to Maintenance.  [facility] will continue to monitor chart and reach out to patient in 2 months to determine if Graduation is appropriate.

## 2022-07-11 NOTE — PROGRESS NOTES
Clinic Care Coordination Contact    Community Health Worker Follow Up    Care Gaps:     Health Maintenance Due   Topic Date Due     PREVENTIVE CARE VISIT  Never done     HPV IMMUNIZATION (1 - Male 2-dose series) Never done     COVID-19 Vaccine (3 - Booster for Pfizer series) 07/18/2022       Patient accepted scheduling phone number for [facility]  to schedule independently     Goals: All goals completed      Intervention and Education during outreach: N/A    CHW Plan: Patient has accomplished goals and has no other goals that this patient would like to work on with Clinic Care Coordination. Community Health Worker sent request to [facility] RN pool to review for Maintenance.    Evelyn Demarco  [facility] Health Worker  [facility]  Clinic Care Coordination   Office: [phone]

## 2022-08-09 ENCOUNTER — TELEPHONE (OUTPATIENT)
Dept: FAMILY MEDICINE | Facility: CLINIC | Age: 27
End: 2022-08-09

## 2022-08-09 NOTE — TELEPHONE ENCOUNTER
Reason for Call:  Other call back    Detailed comments: Pt mother Mildred called wanting to discuss how to keep patient healthy with grand kids going back to school. She is setting up physical but no available until end of September. Mildred requesting call back for advice.     Phone Number Patient can be reached at: Cell number on file:    Telephone Information:   Mobile [phone]       Best Time: Anytime     Can we leave a detailed message on this number? YES    Call taken on 8/9/2022 at 1:36 PM by Ana Calhoun

## 2022-08-09 NOTE — TELEPHONE ENCOUNTER
Mother Mildred notified of provider's message as written. She verbalized understanding.    Encouraged patient to call the clinic with further questions/concerns.     Jorje Jung RN  [facility]

## 2022-08-09 NOTE — TELEPHONE ENCOUNTER
That is a question that if we could solve, all of us in healthcare would be without a job. It is the same for Ramy as it is for all of us. Basically, eat well, stay hydrated, exercise regularly, get routine sleep. Then the other items we try to keep as routine as well. Wash hands, cover our mouths when coughing/sneezing, don't share utensils or cups. If someone is sick, try to keep a 6 foot distance and the sick person should wear a mask when around others.     I'm sorry, but I don't have a great answer to that question.    Respectfully,  Tariq Ferrer DO

## 2022-08-16 ENCOUNTER — TELEPHONE (OUTPATIENT)
Dept: FAMILY MEDICINE | Facility: CLINIC | Age: 27
End: 2022-08-16

## 2022-08-16 DIAGNOSIS — G90.9 AUTONOMIC NEUROPATHY: ICD-10-CM

## 2022-08-16 DIAGNOSIS — G40.909 SEIZURE DISORDER (H): ICD-10-CM

## 2022-08-16 DIAGNOSIS — F84.0 ACTIVE AUTISTIC DISORDER: Primary | ICD-10-CM

## 2022-08-16 NOTE — TELEPHONE ENCOUNTER
"  Order/Referral Request    Who is requesting: Patient's fatherChavo    Orders being requested: Referral to [facility] for \"stander\".    Reason service is needed/diagnosis: Father declined to answer, replied that Dr. Ferrer knows his son's situation.    When are orders needed by: Unknown    Has this been discussed with Provider: Yes, Chavo believes it has been discussed but does not know what appt.    Does patient have a preference on a Group/Provider/Facility? [facility]    Does patient have an appointment scheduled?: No    Where to send orders: Father does not have contact information for clinic     Okay to leave a detailed message?: Unknown, Chavo disconnected call before addressing.    Chavo disconnected call before addressing all questions. He was frustrated with questions for referral/order and stated \"I'll just come in and get it.\"  "

## 2022-08-22 ENCOUNTER — MEDICAL CORRESPONDENCE (OUTPATIENT)
Dept: HEALTH INFORMATION MANAGEMENT | Facility: CLINIC | Age: 27
End: 2022-08-22

## 2022-08-23 ENCOUNTER — PATIENT OUTREACH (OUTPATIENT)
Dept: CARE COORDINATION | Facility: CLINIC | Age: 27
End: 2022-08-23

## 2022-08-23 NOTE — TELEPHONE ENCOUNTER
Clinic Care Coordination - Chart Review Only    Situation/Background: Patient chart reviewed by care coordinator related to [employer] conversion.    Assessment: Patient continues to be followed by Clinic Care Coordination.    Plan: Patient's chart updated to align with [employer] program for ongoing patient management.

## 2022-08-25 ENCOUNTER — PATIENT OUTREACH (OUTPATIENT)
Dept: CARE COORDINATION | Facility: CLINIC | Age: 27
End: 2022-08-25

## 2022-08-25 NOTE — PROGRESS NOTES
Clinic Care Coordination Contact    Situation-Received a request from CHW for patient to transition to [facility] Maintenance.     Background- Patient enrolled into [facility] 2/16/22.  Patient's father seeking assistance with navigating care related to Ramy's feeding tube, obtaining a hospital bed, and scanning Legal Guardianship paperwork into the computer.     Action-Chart review. All goals completed.  No new goals identified.       Response: Okay to offer Graduation at next outreach

## 2022-09-09 ENCOUNTER — PATIENT OUTREACH (OUTPATIENT)
Dept: UROLOGY | Facility: CLINIC | Age: 27
End: 2022-09-09

## 2022-09-09 NOTE — TELEPHONE ENCOUNTER
Pt dad calls into clinic looking for advisement. Pt dad states pt has been scratching at his penis for about a week now. Pt is nonverbal and so dad not quite sure what to make of it. Asking if lab work or imaging may be necessary. Dad states Ramy is a hard person to read, unsure if it is a stone, etc. Scratching intermittently. Dad states it is a little red, but unclear if from scratching or irritation.     Pt did have his incident initially after his stone surgery, which resolved     Chavo states urine looks clear, voiding normally. Pt voids into a depends.     using lotramine which seems to help somewhat- but does not completely resolve.     Per Dr. Casanova, likely some skin irritation or balanitis- would refer to pcp for treatment. Dad notified and agreeable to plan.

## 2022-09-15 ENCOUNTER — PATIENT OUTREACH (OUTPATIENT)
Dept: CARE COORDINATION | Facility: CLINIC | Age: 27
End: 2022-09-15

## 2022-09-15 NOTE — PROGRESS NOTES
Clinic Care Coordination Contact  [facility]/Voicemail     Clinical Data: Care Coordinator Outreach  Outreach attempted x 1.  Left message on patients father Chavo voicemail with call back information and requested return call.  Plan: Care Coordinator will try to reach patient again in 10 business days.    ** Patient on [facility] Maintenance since 7/11/22.    Next CHW outreach: 10/3/22    Evelyn [facility] Health Worker  [facility] Care Coordination   [place], [place] [facility]  Office: [phone]

## 2022-09-26 ENCOUNTER — OFFICE VISIT (OUTPATIENT)
Dept: FAMILY MEDICINE | Facility: CLINIC | Age: 27
End: 2022-09-26
Payer: COMMERCIAL

## 2022-09-26 VITALS
DIASTOLIC BLOOD PRESSURE: 68 MMHG | SYSTOLIC BLOOD PRESSURE: 124 MMHG | BODY MASS INDEX: 23 KG/M2 | WEIGHT: 185 LBS | RESPIRATION RATE: 12 BRPM | HEIGHT: 75 IN | HEART RATE: 84 BPM | TEMPERATURE: 97.4 F

## 2022-09-26 DIAGNOSIS — C91.00 B-CELL ACUTE LYMPHOBLASTIC LEUKEMIA (ALL) (H): ICD-10-CM

## 2022-09-26 DIAGNOSIS — Z91.81 AT HIGH RISK FOR FALLS: ICD-10-CM

## 2022-09-26 DIAGNOSIS — Z23 NEED FOR VACCINATION: ICD-10-CM

## 2022-09-26 DIAGNOSIS — M87.00 AVASCULAR BONE NECROSIS (H): ICD-10-CM

## 2022-09-26 DIAGNOSIS — G40.909 SEIZURE DISORDER (H): ICD-10-CM

## 2022-09-26 DIAGNOSIS — R62.50 DEVELOPMENTAL DELAY: ICD-10-CM

## 2022-09-26 DIAGNOSIS — K59.00 CONSTIPATION, UNSPECIFIED CONSTIPATION TYPE: ICD-10-CM

## 2022-09-26 DIAGNOSIS — E55.9 VITAMIN D DEFICIENCY: ICD-10-CM

## 2022-09-26 DIAGNOSIS — Z00.00 ROUTINE GENERAL MEDICAL EXAMINATION AT A HEALTH CARE FACILITY: Primary | ICD-10-CM

## 2022-09-26 DIAGNOSIS — E78.5 HYPERLIPIDEMIA, UNSPECIFIED HYPERLIPIDEMIA TYPE: ICD-10-CM

## 2022-09-26 DIAGNOSIS — J01.00 ACUTE NON-RECURRENT MAXILLARY SINUSITIS: ICD-10-CM

## 2022-09-26 DIAGNOSIS — Z13.1 DIABETES MELLITUS SCREENING: ICD-10-CM

## 2022-09-26 DIAGNOSIS — F84.0 ACTIVE AUTISTIC DISORDER: ICD-10-CM

## 2022-09-26 PROBLEM — H66.90 ACUTE OTITIS MEDIA: Status: RESOLVED | Noted: 2019-10-31 | Resolved: 2022-09-26

## 2022-09-26 PROBLEM — S92.309A FRACTURE OF METATARSAL BONE: Status: RESOLVED | Noted: 2022-03-01 | Resolved: 2022-09-26

## 2022-09-26 PROBLEM — R63.30 FEEDING DIFFICULTIES: Status: RESOLVED | Noted: 2022-02-22 | Resolved: 2022-09-26

## 2022-09-26 LAB
ALBUMIN SERPL BCG-MCNC: 4.7 G/DL (ref 3.5–5.2)
ALP SERPL-CCNC: 143 U/L (ref 40–129)
ALT SERPL W P-5'-P-CCNC: 22 U/L (ref 10–50)
ANION GAP SERPL CALCULATED.3IONS-SCNC: 13 MMOL/L (ref 7–15)
AST SERPL W P-5'-P-CCNC: 21 U/L (ref 10–50)
BILIRUB SERPL-MCNC: <0.2 MG/DL
BUN SERPL-MCNC: 12.5 MG/DL (ref 6–20)
CALCIUM SERPL-MCNC: 9.3 MG/DL (ref 8.6–10)
CHLORIDE SERPL-SCNC: 98 MMOL/L (ref 98–107)
CHOLEST SERPL-MCNC: 205 MG/DL
CREAT SERPL-MCNC: 0.78 MG/DL (ref 0.67–1.17)
DEPRECATED HCO3 PLAS-SCNC: 25 MMOL/L (ref 22–29)
ERYTHROCYTE [DISTWIDTH] IN BLOOD BY AUTOMATED COUNT: 12.8 % (ref 10–15)
GFR SERPL CREATININE-BSD FRML MDRD: >90 ML/MIN/1.73M2
GLUCOSE SERPL-MCNC: 199 MG/DL (ref 70–99)
HCT VFR BLD AUTO: 38.8 % (ref 40–53)
HDLC SERPL-MCNC: 32 MG/DL
HGB BLD-MCNC: 13.4 G/DL (ref 13.3–17.7)
HOLD SPECIMEN: NORMAL
LDLC SERPL CALC-MCNC: ABNORMAL MG/DL
LDLC SERPL DIRECT ASSAY-MCNC: 99 MG/DL
MCH RBC QN AUTO: 30.1 PG (ref 26.5–33)
MCHC RBC AUTO-ENTMCNC: 34.5 G/DL (ref 31.5–36.5)
MCV RBC AUTO: 87 FL (ref 78–100)
NONHDLC SERPL-MCNC: 173 MG/DL
PLATELET # BLD AUTO: 232 10E3/UL (ref 150–450)
POTASSIUM SERPL-SCNC: 4 MMOL/L (ref 3.4–5.3)
PROT SERPL-MCNC: 7.2 G/DL (ref 6.4–8.3)
RBC # BLD AUTO: 4.45 10E6/UL (ref 4.4–5.9)
SODIUM SERPL-SCNC: 136 MMOL/L (ref 136–145)
TRIGL SERPL-MCNC: 610 MG/DL
WBC # BLD AUTO: 6.8 10E3/UL (ref 4–11)

## 2022-09-26 PROCEDURE — 99395 PREV VISIT EST AGE 18-39: CPT | Mod: 25 | Performed by: FAMILY MEDICINE

## 2022-09-26 PROCEDURE — 82306 VITAMIN D 25 HYDROXY: CPT | Performed by: FAMILY MEDICINE

## 2022-09-26 PROCEDURE — 0124A COVID-19,PF,PFIZER BOOSTER BIVALENT: CPT | Performed by: FAMILY MEDICINE

## 2022-09-26 PROCEDURE — 99213 OFFICE O/P EST LOW 20 MIN: CPT | Mod: 25 | Performed by: FAMILY MEDICINE

## 2022-09-26 PROCEDURE — 36415 COLL VENOUS BLD VENIPUNCTURE: CPT | Performed by: FAMILY MEDICINE

## 2022-09-26 PROCEDURE — 90471 IMMUNIZATION ADMIN: CPT | Performed by: FAMILY MEDICINE

## 2022-09-26 PROCEDURE — 83721 ASSAY OF BLOOD LIPOPROTEIN: CPT | Mod: 59 | Performed by: FAMILY MEDICINE

## 2022-09-26 PROCEDURE — 90686 IIV4 VACC NO PRSV 0.5 ML IM: CPT | Performed by: FAMILY MEDICINE

## 2022-09-26 PROCEDURE — 80061 LIPID PANEL: CPT | Performed by: FAMILY MEDICINE

## 2022-09-26 PROCEDURE — 91312 COVID-19,PF,PFIZER BOOSTER BIVALENT: CPT | Performed by: FAMILY MEDICINE

## 2022-09-26 PROCEDURE — 85027 COMPLETE CBC AUTOMATED: CPT | Performed by: FAMILY MEDICINE

## 2022-09-26 PROCEDURE — 80053 COMPREHEN METABOLIC PANEL: CPT | Performed by: FAMILY MEDICINE

## 2022-09-26 RX ORDER — GABAPENTIN 300 MG/1
600 CAPSULE ORAL AT BEDTIME
COMMUNITY
Start: 2022-09-14

## 2022-09-26 RX ORDER — SERTRALINE HYDROCHLORIDE 100 MG/1
100 TABLET, FILM COATED ORAL DAILY
COMMUNITY
Start: 2022-09-14

## 2022-09-26 ASSESSMENT — ENCOUNTER SYMPTOMS
ARTHRALGIAS: 0
PARESTHESIAS: 0
FEVER: 0
CHILLS: 0
NERVOUS/ANXIOUS: 0
DIARRHEA: 0
NAUSEA: 0
MYALGIAS: 0
COUGH: 1
JOINT SWELLING: 0
ABDOMINAL PAIN: 0
FREQUENCY: 0
WEAKNESS: 0
CONSTIPATION: 1
HEARTBURN: 0
HEMATOCHEZIA: 0
PALPITATIONS: 0
DYSURIA: 0
SORE THROAT: 0
SHORTNESS OF BREATH: 0
HEMATURIA: 0
DIZZINESS: 1
HEADACHES: 0
EYE PAIN: 0

## 2022-09-26 NOTE — PROGRESS NOTES
SUBJECTIVE:   CC: Ramy is an 27 year old who presents for preventative health visit.       Patient has been advised of split billing requirements and indicates understanding: Yes     Weight has recovered.  Doing very well.  No violent outburst.  Has been a little bit more cranky and a lot more congestion along with greenish mucus from the nasal area.  No fevers or chills.  Is taking an antihistamine daily.    Healthy Habits:     Getting at least 3 servings of Calcium per day:  NO    Bi-annual eye exam:  NO    Dental care twice a year:  NO    Sleep apnea or symptoms of sleep apnea:  Daytime drowsiness and Excessive snoring    Diet:  Regular (no restrictions)    Frequency of exercise:  None    Taking medications regularly:  Yes    Barriers to taking medications:  None    Medication side effects:  Not applicable    PHQ-2 Total Score: 0    Additional concerns today:  Yes          Today's PHQ-2 Score:   PHQ-2 ( 1999 Pf[employer]) 9/26/2022   Q1: Little interest or pleasure in doing things 0   Q2: Feeling down, depressed or hopeless 0   PHQ-2 Score 0   Q1: Little interest or pleasure in doing things Not at all   Q2: Feeling down, depressed or hopeless Not at all   PHQ-2 Score 0       Abuse: Current or Past(Physical, Sexual or Emotional)- Pt unable to communicate answer  Do you feel safe in your environment? Pt unable to communicate answer        Social History     Tobacco Use     Smoking status: Never Smoker     Smokeless tobacco: Never Used   Substance Use Topics     Alcohol use: Never       Alcohol Use 9/26/2022   Prescreen: >3 drinks/day or >7 drinks/week? Not Applicable     Reviewed orders with patient. Reviewed health maintenance and updated orders accordingly - Yes  Lab work is in process    Reviewed and updated as needed this visit by clinical staff   Tobacco  Allergies  Meds  Problems  Med Hx  Surg Hx  Fam Hx  Soc   Hx          Reviewed and updated as needed this visit by Provider   Tobacco  Allergies  Meds   "Problems  Med Hx  Surg Hx  Fam Hx             Review of Systems   Constitutional: Negative for chills and fever.   HENT: Positive for congestion and ear pain. Negative for hearing loss and sore throat.    Eyes: Negative for pain and visual disturbance.   Respiratory: Positive for cough. Negative for shortness of breath.    Cardiovascular: Negative for chest pain, palpitations and peripheral edema.   Gastrointestinal: Positive for constipation. Negative for abdominal pain, diarrhea, heartburn, hematochezia and nausea.   Genitourinary: Negative for dysuria, frequency, genital sores, hematuria, impotence, penile discharge and urgency.   Musculoskeletal: Negative for arthralgias, joint swelling and myalgias.   Skin: Negative for rash.   Neurological: Positive for dizziness. Negative for weakness, headaches and paresthesias.   Psychiatric/Behavioral: Positive for mood changes. The patient is not nervous/anxious.        OBJECTIVE:   /68 (BP Location: Left arm, Patient Position: Sitting, Cuff Size: Adult Large)   Pulse 84   Temp 97.4  F (36.3  C) (Axillary)   Resp 12   Ht 1.905 m (6' 3\")   Wt 83.9 kg (185 lb)   BMI 23.12 kg/m      Physical Exam  Vitals and nursing note reviewed.   Constitutional:       General: He is not in acute distress.     Appearance: Normal appearance. He is not ill-appearing.   HENT:      Head: Normocephalic and atraumatic.      Right Ear: Tympanic membrane, ear canal and external ear normal.      Left Ear: Tympanic membrane, ear canal and external ear normal.      Nose: Congestion and rhinorrhea present.      Mouth/Throat:      Mouth: Mucous membranes are moist.      Pharynx: No oropharyngeal exudate or posterior oropharyngeal erythema.   Eyes:      Extraocular Movements: Extraocular movements intact.      Conjunctiva/sclera: Conjunctivae normal.   Cardiovascular:      Rate and Rhythm: Normal rate and regular rhythm.      Pulses: Normal pulses.      Heart sounds: Normal heart sounds. "   Pulmonary:      Effort: Pulmonary effort is normal.      Breath sounds: Normal breath sounds.   Musculoskeletal:      Cervical back: Normal range of motion and neck supple.      Right lower leg: No edema.      Left lower leg: No edema.   Skin:     Capillary Refill: Capillary refill takes less than 2 seconds.   Neurological:      Mental Status: He is alert and oriented to person, place, and time.   Psychiatric:         Attention and Perception: Attention normal.         Mood and Affect: Mood normal.         Speech: Speech normal.         Thought Content: Thought content normal.         ASSESSMENT/PLAN:     Problem List Items Addressed This Visit        Medium    Constipation     Fluid hydration encouraged.           Autistic Disorder     Under care of his parents.  Guardianship paperwork is on file           Seizure disorder (H)     Under care of of neurology.           Relevant Medications    gabapentin (NEURONTIN) 300 MG capsule    Other Relevant Orders    CBC with Platelets and Reflex to Iron Studies    B-cell acute lymphoblastic leukemia (ALL) (H)     Under care of of neurology and oncology.  Continue current specialty care           Relevant Orders    CBC with Platelets and Reflex to Iron Studies    At high risk for falls     Given his developmental delay, weakness in the central core as well as bilateral avascular necrosis, he is at high risk for fall.  They will contact to let to help with the customized standing walker.  This will need custom fitting which is not available in network.           Avascular bone necrosis (H)     Does not seem to be causing pain.  As such it has been determined not for any surgical intervention at this time.  We will continue to monitor.           Developmental delay     Under care of parents           Hyperlipemia     Will recheck levels.           Relevant Orders    Lipid panel reflex to direct LDL Fasting    Comprehensive metabolic panel    Vitamin D deficiency     We will  "recheck levels and just supplementation as needed.           Relevant Orders    Vitamin D Deficiency      Other Visit Diagnoses     Routine general medical examination at a health care facility    -  Primary    Need for vaccination        Relevant Orders    INFLUENZA VACCINE IM > 6 MONTHS VALENT IIV4 (AFLURIA/FLUZONE) (Completed)    COVID-19,PF,PFIZER BOOSTER BIVALENT (12+YRS) (Completed)    Diabetes mellitus screening        Relevant Orders    Comprehensive metabolic panel    Acute non-recurrent maxillary sinusitis        Given length of symptoms as well as the symptoms themselves we will move treatment as per orders.    Relevant Medications    amoxicillin-clavulanate (AUGMENTIN) 875-125 MG tablet          Patient has been advised of split billing requirements and indicates understanding: Yes    COUNSELING:   Reviewed preventive health counseling, as reflected in patient instructions       Regular exercise       Healthy diet/nutrition       Advance Care Planning    Estimated body mass index is 23.12 kg/m  as calculated from the following:    Height as of this encounter: 1.905 m (6' 3\").    Weight as of this encounter: 83.9 kg (185 lb).         He reports that he has never smoked. He has never used smokeless tobacco.      Counseling Resources:  ATP IV Guidelines  Pooled Cohorts Equation Calculator  FRAX Risk Assessment  ICSI Preventive Guidelines  Dietary Guidelines for Americans, 2010  USDA's MyPlate  ASA Prophylaxis  Lung CA Screening    Adrian Ferrer, [facility]  "

## 2022-09-26 NOTE — ASSESSMENT & PLAN NOTE
Does not seem to be causing pain.  As such it has been determined not for any surgical intervention at this time.  We will continue to monitor.

## 2022-09-26 NOTE — TELEPHONE ENCOUNTER
DME (Durable Medical Equipment) Orders and Documentation  Orders Placed This Encounter   Procedures     Miscellaneous DME Order      The patient was assessed and it was determined the patient is in need of the following listed DME Supplies/Equipment. Please complete supporting documentation below to demonstrate medical necessity.      DME All Other Item(s) Documentation    List reason for need and supporting documentation for medical necessity below for each DME item.     1. See referral order

## 2022-09-26 NOTE — ASSESSMENT & PLAN NOTE
Given his developmental delay, weakness in the central core as well as bilateral avascular necrosis, he is at high risk for fall.  They will contact to let to help with the customized standing walker.  This will need custom fitting which is not available in network.

## 2022-09-27 LAB — DEPRECATED CALCIDIOL+CALCIFEROL SERPL-MC: 45 UG/L (ref 20–75)

## 2022-10-04 ENCOUNTER — TELEPHONE (OUTPATIENT)
Dept: FAMILY MEDICINE | Facility: CLINIC | Age: 27
End: 2022-10-04

## 2022-10-04 ENCOUNTER — PATIENT OUTREACH (OUTPATIENT)
Dept: CARE COORDINATION | Facility: CLINIC | Age: 27
End: 2022-10-04

## 2022-10-04 DIAGNOSIS — R62.50 DEVELOPMENTAL DELAY: ICD-10-CM

## 2022-10-04 DIAGNOSIS — G90.9 AUTONOMIC NEUROPATHY: Primary | ICD-10-CM

## 2022-10-04 DIAGNOSIS — M87.00 AVASCULAR BONE NECROSIS (H): ICD-10-CM

## 2022-10-04 NOTE — TELEPHONE ENCOUNTER
Can you please find out from Kendra's or his father as to what department would the referral be placed? PT, OT?

## 2022-10-04 NOTE — TELEPHONE ENCOUNTER
"The patient's father is requesting a referral to [facility] for him to be \"evaluated for a stander\". Fax referral to [phone]    "

## 2022-10-04 NOTE — PROGRESS NOTES
Clinic Care Coordination Contact  [facility]/Voicemail    Clinical Data: Care Coordinator Outreach  Outreach attempted x 2 to patients father Chavo. VM full.  Plan: Care Coordinator will try to reach patient again in 10 business days.    ** Because VM full CHW will make 1 more attempt to patients father Chavo. Patient on CCC Maintenance since 7/11/22.    Next CHW outreach: 10/19/22    Evelyn Demarco  [facility] Health Worker  [facility] Care Coordination   Marleni Quintero [place], [facility]  Office: [phone]

## 2022-10-05 NOTE — TELEPHONE ENCOUNTER
"Family was called states this is a physical therapy evaluation for equipment so patient can get a \"stander\" so it will be covered by insurance.  "

## 2022-10-06 ENCOUNTER — PATIENT OUTREACH (OUTPATIENT)
Dept: CARE COORDINATION | Facility: CLINIC | Age: 27
End: 2022-10-06

## 2022-10-06 NOTE — PROGRESS NOTES
Clinic Care Coordination Contact    Assessment: Care Coordinator contacted patient for 2 month follow up. Unable to reach X 2. Patient has been on Maintenance since 7/2022.  Per chart review patient has continued to follow the plan of care and assessment is negative for any new needs or concerns.    Enrollment status: Graduated.      Plan: No further outreaches at this time.  Patient will continue to follow the plan of care.  If new needs arise a new Care Coordination referral may be placed.

## 2022-10-12 ENCOUNTER — NURSE TRIAGE (OUTPATIENT)
Dept: NURSING | Facility: CLINIC | Age: 27
End: 2022-10-12

## 2022-10-12 ENCOUNTER — TELEPHONE (OUTPATIENT)
Dept: FAMILY MEDICINE | Facility: CLINIC | Age: 27
End: 2022-10-12

## 2022-10-12 DIAGNOSIS — R35.0 URINARY FREQUENCY: Primary | ICD-10-CM

## 2022-10-12 NOTE — TELEPHONE ENCOUNTER
See Nurse Triage call from 1351 10/12/22. Patient already assessed via RN triage and forward to provider at that time to advise.     Closing duplicate encounter. Gabriella Vazquez RN

## 2022-10-12 NOTE — TELEPHONE ENCOUNTER
Reason for call:  Same Day Appointment   Requested Provider: Any one at [place]    PCP: Adrian Albright O DO    Reason for visit: Possible UTI    Duration of symptoms: Unknown    Have you been treated for this in the past? Yes    Additional comments: Patient parent said the patient is autistic and the patient suffers from UTI frequently from the patients need to use adult diapers. Patient parent would like the patient to be seen today, 10/12/22 or first thing in the morning tomorrow, 10/13/22. Please call the patients parent back to assist with this request, thank you.      Phone number to reach patient:  Other phone number:  Patient father [phone]    Best Time:  ASAP    Can we leave a detailed message on this number?  NO    Travel screening: Not Applicable

## 2022-10-12 NOTE — TELEPHONE ENCOUNTER
Pt's father calling, states pt does not speak, autistic    History of UTI, Thinks pt has a UTI, wants pt to be seen  Slept more than usual today, seems lethargic, more tired, had seizure this morning which is usually a sign of UTI previously in the past  Pale, still urinating, uses diaper, still taking fluids, no fever  Changes diaper 4-5 times daily. Does not do well with UC and wait times.       Triage to be seen today or tomorrow. Pt's father states he wants pt to be seen in clinic instead of UC. Wants to send message to provider for appointment. Care advise given, call back if symptoms worsen.    Vannesa Monterroso RN, BSN  10/12/2022 at 1:51 PM  [facility]      Reason for Disposition    Patient wants to be seen    Additional Information    Negative: Shock suspected (e.g., cold/pale/clammy skin, too weak to stand, low BP, rapid pulse)    Negative: Sounds like a life-threatening emergency to the triager    Negative: Unable to urinate (or only a few drops) > 4 hours and bladder feels very full (e.g., palpable bladder or strong urge to urinate)    Negative: Decreased urination and drinking very little and dehydration suspected (e.g., dark urine, no urine > 12 hours, very dry mouth, very lightheaded)    Negative: Patient sounds very sick or weak to the triager    Negative: Fever > 100.4 F  (38.0 C)    Negative: Side (flank) or lower back pain present    Negative: Can't control passage of urine (i.e., urinary incontinence) and new-onset (< 2 weeks) or worsening    Negative: Urinating more frequently than usual (i.e., frequency)    Negative: Bad or foul-smelling urine    Protocols used: URINARY SYMPTOMS-A-OH

## 2022-10-28 DIAGNOSIS — G90.9 AUTONOMIC NEUROPATHY: ICD-10-CM

## 2022-10-30 RX ORDER — RISPERIDONE 3 MG/1
TABLET ORAL
Qty: 75 TABLET | Refills: 3 | Status: SHIPPED | OUTPATIENT
Start: 2022-10-29 | End: 2023-03-15

## 2022-10-30 NOTE — TELEPHONE ENCOUNTER
"Last Written Prescription Date:  6/8/22  Last Fill Quantity: 75,  # refills: 3   Last office visit provider:  9/26/22     Requested Prescriptions   Pending Prescriptions Disp Refills     risperiDONE (RISPERDAL) 3 MG tablet [Pharmacy Med Name: RISPERIDONE 3MG TABLETS] 75 tablet 3     Sig: TAKE 1 TABLET BY MOUTH IN THE MORNING AND 1 1/2 TABLETS AT BEDTIME       Antipsychotic Medications Passed - 10/28/2022  8:28 PM        Passed - Blood pressure under 140/90 in past 12 months     BP Readings from Last 3 Encounters:   09/26/22 124/68   04/13/22 102/64   03/11/22 112/62                 Passed - Patient is 12 years of age or older        Passed - Lipid panel on file within the past 12 months     Recent Labs   Lab Test 09/26/22  1206   CHOL 205*   TRIG 610*   HDL 32*   LDL 99   NHDL 173*               Passed - CBC on file in past 12 months     Recent Labs   Lab Test 09/26/22  1206   WBC 6.8   RBC 4.45   HGB 13.4   HCT 38.8*                    Passed - Heart Rate on file within past 12 months     Pulse Readings from Last 3 Encounters:   09/26/22 84   04/13/22 104   03/11/22 68               Passed - A1c or Glucose on file in past 12 months     Recent Labs   Lab Test 09/26/22  1206   *       Please review patients last 3 weights. If a weight gain of >10 lbs exists, you may refill the prescription once after instructing the patient to schedule an appointment within the next 30 days.    Wt Readings from Last 3 Encounters:   09/26/22 83.9 kg (185 lb)   04/13/22 78.9 kg (174 lb)   03/11/22 78.7 kg (173 lb 9.6 oz)             Passed - Medication is active on med list        Passed - Recent (6 mo) or future (30 days) visit within the authorizing provider's specialty     Patient had office visit in the last 6 months or has a visit in the next 30 days with authorizing provider or within the authorizing provider's specialty.  See \"Patient Info\" tab in inbasket, or \"Choose Columns\" in Meds & Orders section of the refill " encounter.                 Janay Carr RN 10/29/22 11:59 PM

## 2022-12-05 ENCOUNTER — NURSE TRIAGE (OUTPATIENT)
Dept: NURSING | Facility: CLINIC | Age: 27
End: 2022-12-05

## 2022-12-05 NOTE — TELEPHONE ENCOUNTER
"Nurse Triage SBAR    Is this a 2nd Level Triage? NO    Situation: Patient having neurological changes.    Background: Patient is autistic, non verbal and had seizure on 12/3/22 that lasted approximately 3 minutes. Congestion, cough, fever and emesis. Mom had to give patient rectal medication to \"calm him down\". Dad reporting that patient is currently \"neurologically messed up\".    Assessment: Reports fever but unsure of how high. Sister is PCA and reports patient is \"for sure\" more confused than his baseline.     Protocol Recommended Disposition:   Call  Now    Recommendation: Call  Now. Care disposition given and dad and sister verbalize understanding. Report patient would be unable to sit in waiting room. Recommended 911 EMS and encouraged call call ahead to notify ED of patient condition so proper accommodations could be made as able prior to patient arrival. Sister verbalized understanding. Most likely will have patient brought to [facility] ED.    Ava Holland RN on 12/5/2022 at 11:28 AM    Does the patient meet one of the following criteria for ADS visit consideration? 16+ years old, with an [facility] PCP     TIP  Providers, please consider if this condition is appropriate for management at one of our Acute and Diagnostic Services sites.     If patient is a good candidate, please use dotphrase <dot>triageresponse and select Refer to ADS to document.        Reason for Disposition    Difficult to awaken or acting confused (e.g., disoriented, slurred speech)    Protocols used: NEUROLOGIC DEFICIT-A-OH      "

## 2022-12-13 ENCOUNTER — VIRTUAL VISIT (OUTPATIENT)
Dept: FAMILY MEDICINE | Facility: CLINIC | Age: 27
End: 2022-12-13
Payer: COMMERCIAL

## 2022-12-13 DIAGNOSIS — J10.1 INFLUENZA A: Primary | ICD-10-CM

## 2022-12-13 DIAGNOSIS — R47.01 DOES NOT SPEAK: ICD-10-CM

## 2022-12-13 DIAGNOSIS — N39.0 UTI DUE TO KLEBSIELLA SPECIES: ICD-10-CM

## 2022-12-13 DIAGNOSIS — G40.909 SEIZURE DISORDER (H): ICD-10-CM

## 2022-12-13 DIAGNOSIS — F84.0 ACTIVE AUTISTIC DISORDER: ICD-10-CM

## 2022-12-13 DIAGNOSIS — B96.89 UTI DUE TO KLEBSIELLA SPECIES: ICD-10-CM

## 2022-12-13 DIAGNOSIS — R09.81 NASAL CONGESTION: ICD-10-CM

## 2022-12-13 PROBLEM — Z62.820 PROBLEM BETWEEN PARENT AND CHILD: Status: RESOLVED | Noted: 2022-03-01 | Resolved: 2022-12-13

## 2022-12-13 PROCEDURE — 99214 OFFICE O/P EST MOD 30 MIN: CPT | Mod: TEL | Performed by: PHYSICIAN ASSISTANT

## 2022-12-13 RX ORDER — DIAZEPAM 20 MG/4G
GEL RECTAL
Qty: 1 EACH | Refills: 1 | Status: SHIPPED | OUTPATIENT
Start: 2022-12-13

## 2022-12-13 RX ORDER — CEPHALEXIN 500 MG/1
CAPSULE ORAL
COMMUNITY
Start: 2022-12-08 | End: 2024-02-05

## 2022-12-13 RX ORDER — PSEUDOEPHEDRINE HCL 120 MG/1
120 TABLET, FILM COATED, EXTENDED RELEASE ORAL EVERY 12 HOURS
Qty: 28 TABLET | Refills: 0 | Status: SHIPPED | OUTPATIENT
Start: 2022-12-13 | End: 2022-12-27

## 2022-12-13 NOTE — PROGRESS NOTES
Ramy is a 27 year old who is being evaluated via a billable telephone visit.    Assessment & Plan     Influenza A  Nasal congestion - completed course of tamiflu, continue to manage symptomatically  - pseudoePHEDrine (SUDAFED) 120 MG 12 hr tablet  Dispense: 28 tablet; Refill: 0    UTI due to Klebsiella species - reviewed urine culture & sensitivities, continue current antibiotic regimen    Seizure disorder (H) - no further abnormal seizure activity, has close follow up with neurology. Refill of rescue medication provided.   - diazepam (DIASTAT) 20 MG GEL rectal gel  Dispense: 1 each; Refill: 1    Autistic Disorder  Does not speak - behavioral changes improving, but not yet back to baseline. Suspect with resolution of influenza & UTI, he will return to his baseline. Continue therapies as above & supportive cares.     MED REC REQUIRED  Post Medication Reconciliation Status:  Patient was not discharged from an inpatient facility or TCU    No follow-ups on file.    Rox Parsons PA-C  [facility]      Madeleine Mccann is a 27 year old - discussed with mother (Mildred), presenting for the following health issues:  Hospital F/U    Seen in ED twice - 12/5, 12/8  Had grand mal seizure on 12/3, was fatigued and congested  Sunday was not himself - uncoordinated, punching his face as he was attempting to rub his eyes  Mother gave seizure rescue med Sunday and he slept well  Had ongoing ill symptoms 12/5 plus emesis so went to ED and positive for flu A, treated with tamiflu  Persistently ill symptoms, abnormal from baseline so went back to ED on 12/8 and found to have UTI, treated with keflex    Now still having quite a bit of congestion, not back to behavioral baseline but improving  Mom has questions about urine culture, that abx are correct ones  No other abnormal seizure activity  Ongoing congestion    HPI     Review of Systems         Objective       Vitals:  No vitals were obtained today due to  virtual visit.    Physical Exam     Remainder of exam unable to be completed due to telephone visits    Phone call duration: 13 minutes

## 2022-12-14 ENCOUNTER — TRANSFERRED RECORDS (OUTPATIENT)
Dept: HEALTH INFORMATION MANAGEMENT | Facility: CLINIC | Age: 27
End: 2022-12-14

## 2022-12-14 ENCOUNTER — MEDICAL CORRESPONDENCE (OUTPATIENT)
Dept: HEALTH INFORMATION MANAGEMENT | Facility: CLINIC | Age: 27
End: 2022-12-14

## 2023-01-16 ENCOUNTER — TRANSFERRED RECORDS (OUTPATIENT)
Dept: HEALTH INFORMATION MANAGEMENT | Facility: CLINIC | Age: 28
End: 2023-01-16

## 2023-02-13 ENCOUNTER — TRANSFERRED RECORDS (OUTPATIENT)
Dept: HEALTH INFORMATION MANAGEMENT | Facility: CLINIC | Age: 28
End: 2023-02-13

## 2023-03-15 DIAGNOSIS — G90.9 AUTONOMIC NEUROPATHY: ICD-10-CM

## 2023-03-15 RX ORDER — RISPERIDONE 3 MG/1
TABLET ORAL
Qty: 75 TABLET | Refills: 3 | Status: SHIPPED | OUTPATIENT
Start: 2023-03-15 | End: 2023-08-14

## 2023-04-11 ENCOUNTER — TRANSFERRED RECORDS (OUTPATIENT)
Dept: HEALTH INFORMATION MANAGEMENT | Facility: CLINIC | Age: 28
End: 2023-04-11

## 2023-04-11 ENCOUNTER — MEDICAL CORRESPONDENCE (OUTPATIENT)
Dept: HEALTH INFORMATION MANAGEMENT | Facility: CLINIC | Age: 28
End: 2023-04-11

## 2023-05-03 ENCOUNTER — MEDICAL CORRESPONDENCE (OUTPATIENT)
Dept: HEALTH INFORMATION MANAGEMENT | Facility: CLINIC | Age: 28
End: 2023-05-03
Payer: COMMERCIAL

## 2023-06-16 ENCOUNTER — TELEPHONE (OUTPATIENT)
Dept: FAMILY MEDICINE | Facility: CLINIC | Age: 28
End: 2023-06-16
Payer: COMMERCIAL

## 2023-06-16 DIAGNOSIS — R62.50 DEVELOPMENTAL DELAY: ICD-10-CM

## 2023-06-16 DIAGNOSIS — M87.00 AVASCULAR BONE NECROSIS (H): ICD-10-CM

## 2023-06-16 DIAGNOSIS — F84.0 ACTIVE AUTISTIC DISORDER: Primary | ICD-10-CM

## 2023-06-16 DIAGNOSIS — G90.9 AUTONOMIC NEUROPATHY: ICD-10-CM

## 2023-06-16 NOTE — TELEPHONE ENCOUNTER
"Tova Tony father calling to state that a referral was made to [facility] for a scanner for Ramy.  Ramy worked with a \"therapist\" to pick out a scanner that works for him.  Chavo just received a bill for $1800 for the therapist that worked with Ramy to pick out the scanner.  When Chavo called the insurance they stated they saw the referral for the scanner but nothing for the therapist.     insurance is refusing to pay for the therapists- Chavo is wondering if a referral can be written or if there is something that Dr Ferrer can do to assist with getting the insurance company to pay for the therapist.      Routing to Dr Ferrer to please advise  "

## 2023-06-20 NOTE — TELEPHONE ENCOUNTER
JACK sent over to [facility] Medical Review Team to review request for an insurance referral to  for physical therapy services to [facility] Referral Coordinator

## 2023-06-20 NOTE — TELEPHONE ENCOUNTER
Referral was placed in October 2022. Have Chavo check with insurance, did they need a new one for 2023, or is Oct 2022 good through Sept 2023?

## 2023-06-22 NOTE — TELEPHONE ENCOUNTER
LM with dad/Chavo, requesting him to call me back with dates and amount of visits for PT services that Ramy had at [facility].    Date noted in chart 12.14.2022- need clarification and to make sure if there were more services completed     ~Susie GODOY [facility] Referral Coordinator      Approved OON request   Approve out of network service to [facility]. Reason: Services not available INN

## 2023-06-26 NOTE — TELEPHONE ENCOUNTER
Father Chavo states that the number left on his VM by Susie JACKSON [phone] does not connect, only has an error message.    Patient was seen at [facility] on the dates below.  09/14/22 09/29/22 01/12/23 01/26/23 02/09/23 03/08/23    Chavo states to call him back with any additional questions regarding this.  DTM Ok.

## 2023-06-26 NOTE — TELEPHONE ENCOUNTER
I will work on entering insurance referral with [facility] Insurance    ~Susie GODOY [facility] Referral Coordinator

## 2023-06-27 NOTE — TELEPHONE ENCOUNTER
Reached out to riki/Chavo, went over approval insurance referrals to [facility].    [facility] referral for [facility]  Referral auth #[id number]   01.01.2023 - 12.31.2023  12 visits approved     [facility] Insurance referral for 2022  Referral auth #[id number]  09.01.2022 - 12.31.2022  3 visits approved    Chavo thanked us and will reach out if he needs any other assistance.    ~Susie  [facility] Referral Coordinator

## 2023-08-13 DIAGNOSIS — G90.9 AUTONOMIC NEUROPATHY: ICD-10-CM

## 2023-08-13 NOTE — TELEPHONE ENCOUNTER
"Routing refill request to provider for review/approval because:  Patient needs to be seen because it has been more than 6 months since last office visit.  Early refill request     Last Written Prescription Date:  3/15/23  Last Fill Quantity: 75,  # refills: 3   Last office visit provider:   9/26/22    Requested Prescriptions   Pending Prescriptions Disp Refills    risperiDONE (RISPERDAL) 3 MG tablet [Pharmacy Med Name: RISPERIDONE 3MG TABLETS] 75 tablet 3     Sig: TAKE 1 TABLET BY MOUTH EVERY MORNING AND 1.5 TABLETS EVERY NIGHT AT BEDTIME       Antipsychotic Medications Failed - 8/13/2023  8:25 AM        Failed - Recent (6 mo) or future (30 days) visit within the authorizing provider's specialty     Patient had office visit in the last 6 months or has a visit in the next 30 days with authorizing provider or within the authorizing provider's specialty.  See \"Patient Info\" tab in inbasket, or \"Choose Columns\" in Meds & Orders section of the refill encounter.            Passed - Blood pressure under 140/90 in past 12 months     BP Readings from Last 3 Encounters:   09/26/22 124/68   04/13/22 102/64   03/11/22 112/62                 Passed - Lipid panel on file within the past 12 months     Recent Labs   Lab Test 09/26/22  1206   CHOL 205*   TRIG 610*   HDL 32*   LDL 99   NHDL 173*               Passed - Heart Rate on file within past 12 months     Pulse Readings from Last 3 Encounters:   09/26/22 84   04/13/22 104   03/11/22 68               Passed - A1c or Glucose on file in past 12 months     Recent Labs   Lab Test 09/26/22  1206   *       Please review patients last 3 weights. If a weight gain of >10 lbs exists, you may refill the prescription once after instructing the patient to schedule an appointment within the next 30 days.    Wt Readings from Last 3 Encounters:   09/26/22 83.9 kg (185 lb)   04/13/22 78.9 kg (174 lb)   03/11/22 78.7 kg (173 lb 9.6 oz)             Passed - Medication is active on med list "        Passed - Patient is 18 years of age or older             Sherry Alves RN 08/13/23 8:29 AM

## 2023-08-14 RX ORDER — RISPERIDONE 3 MG/1
TABLET ORAL
Qty: 75 TABLET | Refills: 3 | Status: SHIPPED | OUTPATIENT
Start: 2023-08-14 | End: 2023-12-07

## 2023-08-17 ENCOUNTER — TELEPHONE (OUTPATIENT)
Dept: UROLOGY | Facility: CLINIC | Age: 28
End: 2023-08-17
Payer: COMMERCIAL

## 2023-08-17 NOTE — TELEPHONE ENCOUNTER
[facility] Call Center    Phone Message    May a detailed message be left on voicemail: yes     Reason for Call: Other: pt father calling, pt has blood in his diaper last couple of days, please advise with dad     Action Taken: Other: urology    Travel Screening: Not Applicable

## 2023-08-17 NOTE — TELEPHONE ENCOUNTER
S: Spoke to pt's dad. He reports noting blood in pt's diaper.   B: 28-year-old non-verbal male with history of epilepsy, autistic disorder, and stones. Last visit with Dr. Casanova on 3/15/22.   A: He reports noting very small amount of blood towards front of diaper on Monday and Tuesday. Pt was acting crabby those days. No other concerning symptoms. He states that the pt was fine yesterday and is today. No other bleeding noted.   R: Advised to monitor for recurrence or other associated symptoms. Reassured him that if not persisting or worsening, ok to just monitor at this time. Advised to schedule follow up and reminded that follow up included x-ray. He verbalized understanding and agreed to schedule. No other questions noted.     Kinza Saha RN MSN

## 2023-08-24 ENCOUNTER — PRE VISIT (OUTPATIENT)
Dept: UROLOGY | Facility: CLINIC | Age: 28
End: 2023-08-24
Payer: COMMERCIAL

## 2023-08-24 NOTE — TELEPHONE ENCOUNTER
Reason for visit: Follow-Up    Dx/Hx/Sx: Kidney stone    Records/imaging/labs/orders: In EPIC    At Rooming: standard video    Kb Marcos, EMT  August 24, 2023  1:33 PM

## 2023-08-28 ENCOUNTER — HOSPITAL ENCOUNTER (OUTPATIENT)
Dept: RADIOLOGY | Facility: CLINIC | Age: 28
Discharge: HOME | End: 2023-08-28
Attending: UROLOGY | Admitting: UROLOGY
Payer: COMMERCIAL

## 2023-08-28 ENCOUNTER — PATIENT OUTREACH (OUTPATIENT)
Dept: CARE COORDINATION | Facility: CLINIC | Age: 28
End: 2023-08-28
Payer: COMMERCIAL

## 2023-08-28 DIAGNOSIS — N20.0 KIDNEY STONE: ICD-10-CM

## 2023-08-28 PROCEDURE — 74018 RADEX ABDOMEN 1 VIEW: CPT

## 2023-09-01 ENCOUNTER — PATIENT OUTREACH (OUTPATIENT)
Dept: UROLOGY | Facility: CLINIC | Age: 28
End: 2023-09-01
Payer: COMMERCIAL

## 2023-09-01 DIAGNOSIS — N20.0 KIDNEY STONE: Primary | ICD-10-CM

## 2023-09-01 NOTE — TELEPHONE ENCOUNTER
Pt dad calls into clinic stating he has gross hematuria in his depends has been intermittent last few weeks.     Per Dr. Casanova pt should have a CT scan and follow up    Pt is scheduled for follow up already mid sept    Ct ordered and

## 2023-09-11 ENCOUNTER — PATIENT OUTREACH (OUTPATIENT)
Dept: CARE COORDINATION | Facility: CLINIC | Age: 28
End: 2023-09-11
Payer: COMMERCIAL

## 2023-09-16 ENCOUNTER — HOSPITAL ENCOUNTER (OUTPATIENT)
Dept: CT IMAGING | Facility: CLINIC | Age: 28
Discharge: HOME | End: 2023-09-16
Attending: UROLOGY | Admitting: UROLOGY
Payer: COMMERCIAL

## 2023-09-16 DIAGNOSIS — N20.0 KIDNEY STONE: ICD-10-CM

## 2023-09-16 PROCEDURE — 74176 CT ABD & PELVIS W/O CONTRAST: CPT

## 2023-09-19 ENCOUNTER — VIRTUAL VISIT (OUTPATIENT)
Dept: UROLOGY | Facility: CLINIC | Age: 28
End: 2023-09-19
Payer: COMMERCIAL

## 2023-09-19 VITALS — BODY MASS INDEX: 23.75 KG/M2 | WEIGHT: 190 LBS

## 2023-09-19 DIAGNOSIS — N20.0 KIDNEY STONE: Primary | ICD-10-CM

## 2023-09-19 PROCEDURE — 99213 OFFICE O/P EST LOW 20 MIN: CPT | Mod: VID | Performed by: UROLOGY

## 2023-09-19 ASSESSMENT — PAIN SCALES - GENERAL: PAINLEVEL: NO PAIN (0)

## 2023-09-19 NOTE — LETTER
9/19/2023       RE: Ramy Beasley  [address] [facility] [address]     Dear Colleague,    Thank you for referring your patient, Ramy Beasley, to the [facility]OLOG[facility] [place] at [facility]. Please see a copy of my visit note below.            UROLOGY OUTPATIENT VISIT      Chief Complaint:   Gross hematuria      Synopsis    Ramy Beasley is a very pleasant AGE: 28 year old year old person with a complex PMH, including epilepsy, active autistic disorder, acute lymphoblastic leukemia in remission, mild intellectual disability, hyperlipidemia and autonomic neuropathy who has previously had kidney stones.  He underwent ureteroscopy with me last in 2022.    A few weeks ago he had some incidental gross hematuria.  Per his mother who is acting as his primary historian it was unclear whether or not he was having pain at the time.  Hematuria was self-limited and resolved on its own.  He initially had a renal ultrasound which was suboptimal as he was noncooperative with the exam.  A CT scan subsequently was performed which I personally reviewed and does not show any evidence of obstructing stones or bladder pathology based on what can be assessed on the noncontrast examination.    Per his mother he is doing well he is voiding regularly without any evidence of discomfort and no further hematuria.  No stone was seen but they were not monitoring for 1.           Medications     Current Outpatient Medications   Medication    cholecalciferol 50 MCG (2000 UT) CAPS    clonazePAM (KLONOPIN) 0.5 MG tablet    clotrimazole (LOTRIMIN) 1 % external cream    diazepam (DIASTAT) 20 MG GEL rectal gel    gabapentin (NEURONTIN) 300 MG capsule    lamoTRIgine (LAMICTAL) 100 MG tablet    levETIRAcetam (KEPPRA) 750 MG tablet    loratadine (CLARITIN) 10 MG tablet    medical cannabis (Patient's own supply)    MELATONIN PO    Multiple vitamin TABS    Omega-3 Fatty Acids  (FISH OIL) 1200 MG capsule    OXcarbazepine (TRILEPTAL) 150 MG tablet    OXcarbazepine (TRILEPTAL) 600 MG tablet    polyethylene glycol (MIRALAX) 17 gram packet    QUEtiapine (SEROQUEL) 300 MG tablet    risperiDONE (RISPERDAL) 3 MG tablet    senna-docusate (SENOKOT-S/PERICOLACE) 8.6-50 MG tablet    sertraline (ZOLOFT) 100 MG tablet    cephALEXin (KEFLEX) 500 MG capsule    gabapentin (NEURONTIN) 400 MG capsule     No current facility-administered medications for this visit.         The following  distinct labs were reviewed    I personally reviewed all applicable laboratory data and went over findings with patient  Significant for:    CBC RESULTS:  Recent Labs   Lab Test 09/26/22  1206 02/28/22  1321   WBC 6.8 6.3   HGB 13.4 12.6*    250        BMP RESULTS:  Recent Labs   Lab Test 09/26/22  1206 02/28/22  1321    141   POTASSIUM 4.0 3.8   CHLORIDE 98 106   CO2 25 22   ANIONGAP 13 13   * 157*   BUN 12.5 24*   CR 0.78 0.87   GFRESTIMATED >90 >90       CALCIUM RESULTS:  Recent Labs   Lab Test 09/26/22  1206 02/28/22  1321   GERARDO 9.3 9.2   UA RESULTS:   Recent Labs   Lab Test 01/18/22  1328   SG 1.020   URINEPH 6.0   NITRITE Negative   RBCU 22*   WBCU 26*       Recent Imaging Report    I personally reviewed all applicable imaging and went over the below findings with patient.    Results for orders placed or performed during the hospital encounter of 09/16/23   CT Abdomen pelvis w/o contrast    Narrative    EXAM: CT ABDOMEN PELVIS W/O CONTRAST  LOCATION: [facility]  DATE: 9/16/2023    INDICATION:  Kidney stone  COMPARISON: 12/31/2021 [facility]  TECHNIQUE: CT scan of the abdomen and pelvis was performed without IV contrast. Multiplanar reformats were obtained. Dose reduction techniques were used.  CONTRAST: None.    FINDINGS:   LOWER CHEST: Normal.    HEPATOBILIARY: Moderate diffuse hepatic steatosis.    PANCREAS: Normal.    SPLEEN: Normal.    ADRENAL GLANDS:  Normal.    KIDNEYS/BLADDER: The right kidney, single 2 mm nonobstructing stone mid kidney. No hydronephrosis.    In the left kidney, faint 1 mm nonobstructing stone in the lower pole. No hydronephrosis.    Normal bladder.    BOWEL: Moderate diverticulosis but nothing for diverticulitis.    LYMPH NODES: Normal.    VASCULATURE: Unremarkable.    PELVIC ORGANS: Normal.    MUSCULOSKELETAL: Normal.      Impression    IMPRESSION:   1.  Single tiny nonobstructing stone in each kidney.    2.  No hydronephrosis on either side.            Assessment/Plan   28 year old year old person with history of kidney stones and isolated instance of gross hematuria  -Suspect hematuria may have been related to a kidney stone although this is not absolutely certain given that imaging was performed after his hematuria had resolved  -We discussed monitoring for further instances and if hematuria recurs would recommend an outpatient cystoscopy    CC:  Adrian Ferrer          Virtual Visit Details    Type of service:  Video Visit   Video Start Time:  9:00  Video End Time: 9:15    Originating Location (pt. Location): Home    Distant Location (provider location):  On-site  Platform used for Video Visit: Mandie Casanova MD

## 2023-09-19 NOTE — NURSING NOTE
Is the patient currently in the state of [place]? YES    Visit mode:VIDEO    If the visit is dropped, the patient can be reconnected by: VIDEO VISIT: Text to cell phone:   Telephone Information:   Mobile [phone]       Will anyone else be joining the visit? NO  (If patient encounters technical issues they should call [phone]    How would you like to obtain your AVS? Declined    Are changes needed to the allergy or medication list? Yes LAMOTRIGINE ORAL 500.0 MG BID ACTIVE    Reason for visit: RECHECK    Susie LUDWIG

## 2023-09-19 NOTE — PROGRESS NOTES
UROLOGY OUTPATIENT VISIT      Chief Complaint:   Gross hematuria      Synopsis    Ramy Beasley is a very pleasant AGE: 28 year old year old person with a complex PMH, including epilepsy, active autistic disorder, acute lymphoblastic leukemia in remission, mild intellectual disability, hyperlipidemia and autonomic neuropathy who has previously had kidney stones.  He underwent ureteroscopy with me last in 2022.    A few weeks ago he had some incidental gross hematuria.  Per his mother who is acting as his primary historian it was unclear whether or not he was having pain at the time.  Hematuria was self-limited and resolved on its own.  He initially had a renal ultrasound which was suboptimal as he was noncooperative with the exam.  A CT scan subsequently was performed which I personally reviewed and does not show any evidence of obstructing stones or bladder pathology based on what can be assessed on the noncontrast examination.    Per his mother he is doing well he is voiding regularly without any evidence of discomfort and no further hematuria.  No stone was seen but they were not monitoring for 1.           Medications     Current Outpatient Medications   Medication    cholecalciferol 50 MCG (2000 UT) CAPS    clonazePAM (KLONOPIN) 0.5 MG tablet    clotrimazole (LOTRIMIN) 1 % external cream    diazepam (DIASTAT) 20 MG GEL rectal gel    gabapentin (NEURONTIN) 300 MG capsule    lamoTRIgine (LAMICTAL) 100 MG tablet    levETIRAcetam (KEPPRA) 750 MG tablet    loratadine (CLARITIN) 10 MG tablet    medical cannabis (Patient's own supply)    MELATONIN PO    Multiple vitamin TABS    Omega-3 Fatty Acids (FISH OIL) 1200 MG capsule    OXcarbazepine (TRILEPTAL) 150 MG tablet    OXcarbazepine (TRILEPTAL) 600 MG tablet    polyethylene glycol (MIRALAX) 17 gram packet    QUEtiapine (SEROQUEL) 300 MG tablet    risperiDONE (RISPERDAL) 3 MG tablet    senna-docusate (SENOKOT-S/PERICOLACE) 8.6-50 MG tablet    sertraline  (ZOLOFT) 100 MG tablet    cephALEXin (KEFLEX) 500 MG capsule    gabapentin (NEURONTIN) 400 MG capsule     No current facility-administered medications for this visit.         The following  distinct labs were reviewed    I personally reviewed all applicable laboratory data and went over findings with patient  Significant for:    CBC RESULTS:  Recent Labs   Lab Test 09/26/22  1206 02/28/22  1321   WBC 6.8 6.3   HGB 13.4 12.6*    250        BMP RESULTS:  Recent Labs   Lab Test 09/26/22  1206 02/28/22  1321    141   POTASSIUM 4.0 3.8   CHLORIDE 98 106   CO2 25 22   ANIONGAP 13 13   * 157*   BUN 12.5 24*   CR 0.78 0.87   GFRESTIMATED >90 >90       CALCIUM RESULTS:  Recent Labs   Lab Test 09/26/22  1206 02/28/22  1321   GERARDO 9.3 9.2   UA RESULTS:   Recent Labs   Lab Test 01/18/22  1328   SG 1.020   URINEPH 6.0   NITRITE Negative   RBCU 22*   WBCU 26*       Recent Imaging Report    I personally reviewed all applicable imaging and went over the below findings with patient.    Results for orders placed or performed during the hospital encounter of 09/16/23   CT Abdomen pelvis w/o contrast    Narrative    EXAM: CT ABDOMEN PELVIS W/O CONTRAST  LOCATION: [facility]  DATE: 9/16/2023    INDICATION:  Kidney stone  COMPARISON: 12/31/2021 [facility]  TECHNIQUE: CT scan of the abdomen and pelvis was performed without IV contrast. Multiplanar reformats were obtained. Dose reduction techniques were used.  CONTRAST: None.    FINDINGS:   LOWER CHEST: Normal.    HEPATOBILIARY: Moderate diffuse hepatic steatosis.    PANCREAS: Normal.    SPLEEN: Normal.    ADRENAL GLANDS: Normal.    KIDNEYS/BLADDER: The right kidney, single 2 mm nonobstructing stone mid kidney. No hydronephrosis.    In the left kidney, faint 1 mm nonobstructing stone in the lower pole. No hydronephrosis.    Normal bladder.    BOWEL: Moderate diverticulosis but nothing for diverticulitis.    LYMPH NODES:  Normal.    VASCULATURE: Unremarkable.    PELVIC ORGANS: Normal.    MUSCULOSKELETAL: Normal.      Impression    IMPRESSION:   1.  Single tiny nonobstructing stone in each kidney.    2.  No hydronephrosis on either side.            Assessment/Plan   28 year old year old person with history of kidney stones and isolated instance of gross hematuria  -Suspect hematuria may have been related to a kidney stone although this is not absolutely certain given that imaging was performed after his hematuria had resolved  -We discussed monitoring for further instances and if hematuria recurs would recommend an outpatient cystoscopy    CC:  Adrian Ferrer          Virtual Visit Details    Type of service:  Video Visit   Video Start Time:  9:00  Video End Time: 9:15    Originating Location (pt. Location): Home    Distant Location (provider location):  On-site  Platform used for Video Visit: Mandie

## 2023-12-07 DIAGNOSIS — G90.9 AUTONOMIC NEUROPATHY: ICD-10-CM

## 2023-12-07 RX ORDER — RISPERIDONE 3 MG/1
TABLET ORAL
Qty: 75 TABLET | Refills: 3 | Status: SHIPPED | OUTPATIENT
Start: 2023-12-07 | End: 2024-04-08

## 2023-12-07 NOTE — TELEPHONE ENCOUNTER
Medication Request  Medication name: risperiDONE (RISPERDAL) 3 MG tablet   Patient Sig: TAKE 1 TABLET BY MOUTH EVERY MORNING AND 1.5 TABLETS EVERY NIGHT AT BEDTIME     Requested Pharmacy: [id number]  When was patient last seen for this?:  09/26/22  Patient offered appointment:  JEANETH x1 for Mildred Patient's mother.  Okay to leave a detailed message: no

## 2024-02-05 ENCOUNTER — OFFICE VISIT (OUTPATIENT)
Dept: FAMILY MEDICINE | Facility: CLINIC | Age: 29
End: 2024-02-05
Payer: COMMERCIAL

## 2024-02-05 VITALS
HEART RATE: 90 BPM | BODY MASS INDEX: 21.7 KG/M2 | RESPIRATION RATE: 16 BRPM | HEIGHT: 75 IN | DIASTOLIC BLOOD PRESSURE: 81 MMHG | SYSTOLIC BLOOD PRESSURE: 128 MMHG | WEIGHT: 174.5 LBS | OXYGEN SATURATION: 97 %

## 2024-02-05 DIAGNOSIS — Z00.00 ROUTINE GENERAL MEDICAL EXAMINATION AT A HEALTH CARE FACILITY: Primary | ICD-10-CM

## 2024-02-05 DIAGNOSIS — R47.01 DOES NOT SPEAK: ICD-10-CM

## 2024-02-05 DIAGNOSIS — D63.8 ANEMIA OF CHRONIC DISEASE: ICD-10-CM

## 2024-02-05 DIAGNOSIS — R40.0 DAYTIME SLEEPINESS: ICD-10-CM

## 2024-02-05 DIAGNOSIS — F84.0 ACTIVE AUTISTIC DISORDER: ICD-10-CM

## 2024-02-05 DIAGNOSIS — E78.5 HYPERLIPIDEMIA, UNSPECIFIED HYPERLIPIDEMIA TYPE: ICD-10-CM

## 2024-02-05 DIAGNOSIS — R32 URINARY INCONTINENCE, UNSPECIFIED TYPE: ICD-10-CM

## 2024-02-05 DIAGNOSIS — C91.00 B-CELL ACUTE LYMPHOBLASTIC LEUKEMIA (ALL) (H): ICD-10-CM

## 2024-02-05 DIAGNOSIS — Z23 NEED FOR VACCINATION: ICD-10-CM

## 2024-02-05 DIAGNOSIS — G40.909 SEIZURE DISORDER (H): ICD-10-CM

## 2024-02-05 DIAGNOSIS — M87.00 AVASCULAR BONE NECROSIS (H): ICD-10-CM

## 2024-02-05 LAB
ERYTHROCYTE [DISTWIDTH] IN BLOOD BY AUTOMATED COUNT: 13 % (ref 10–15)
HCT VFR BLD AUTO: 40.1 % (ref 40–53)
HGB BLD-MCNC: 13.4 G/DL (ref 13.3–17.7)
HOLD SPECIMEN: NORMAL
MCH RBC QN AUTO: 29.1 PG (ref 26.5–33)
MCHC RBC AUTO-ENTMCNC: 33.4 G/DL (ref 31.5–36.5)
MCV RBC AUTO: 87 FL (ref 78–100)
PLATELET # BLD AUTO: 219 10E3/UL (ref 150–450)
RBC # BLD AUTO: 4.6 10E6/UL (ref 4.4–5.9)
WBC # BLD AUTO: 5.2 10E3/UL (ref 4–11)

## 2024-02-05 PROCEDURE — 80061 LIPID PANEL: CPT | Performed by: FAMILY MEDICINE

## 2024-02-05 PROCEDURE — 90480 ADMN SARSCOV2 VAC 1/ONLY CMP: CPT | Performed by: FAMILY MEDICINE

## 2024-02-05 PROCEDURE — 80053 COMPREHEN METABOLIC PANEL: CPT | Performed by: FAMILY MEDICINE

## 2024-02-05 PROCEDURE — 90472 IMMUNIZATION ADMIN EACH ADD: CPT | Performed by: FAMILY MEDICINE

## 2024-02-05 PROCEDURE — 36415 COLL VENOUS BLD VENIPUNCTURE: CPT | Performed by: FAMILY MEDICINE

## 2024-02-05 PROCEDURE — 90686 IIV4 VACC NO PRSV 0.5 ML IM: CPT | Performed by: FAMILY MEDICINE

## 2024-02-05 PROCEDURE — 91320 SARSCV2 VAC 30MCG TRS-SUC IM: CPT | Performed by: FAMILY MEDICINE

## 2024-02-05 PROCEDURE — 99395 PREV VISIT EST AGE 18-39: CPT | Mod: 25 | Performed by: FAMILY MEDICINE

## 2024-02-05 PROCEDURE — 83721 ASSAY OF BLOOD LIPOPROTEIN: CPT | Mod: 59 | Performed by: FAMILY MEDICINE

## 2024-02-05 PROCEDURE — 85027 COMPLETE CBC AUTOMATED: CPT | Performed by: FAMILY MEDICINE

## 2024-02-05 PROCEDURE — 99213 OFFICE O/P EST LOW 20 MIN: CPT | Mod: 25 | Performed by: FAMILY MEDICINE

## 2024-02-05 PROCEDURE — 90677 PCV20 VACCINE IM: CPT | Performed by: FAMILY MEDICINE

## 2024-02-05 PROCEDURE — 84443 ASSAY THYROID STIM HORMONE: CPT | Performed by: FAMILY MEDICINE

## 2024-02-05 PROCEDURE — 90471 IMMUNIZATION ADMIN: CPT | Performed by: FAMILY MEDICINE

## 2024-02-05 RX ORDER — CETIRIZINE HYDROCHLORIDE 10 MG/1
10 TABLET ORAL DAILY
COMMUNITY

## 2024-02-05 SDOH — HEALTH STABILITY: PHYSICAL HEALTH: ON AVERAGE, HOW MANY DAYS PER WEEK DO YOU ENGAGE IN MODERATE TO STRENUOUS EXERCISE (LIKE A BRISK WALK)?: 0 DAYS

## 2024-02-05 ASSESSMENT — SOCIAL DETERMINANTS OF HEALTH (SDOH): HOW OFTEN DO YOU GET TOGETHER WITH FRIENDS OR RELATIVES?: MORE THAN THREE TIMES A WEEK

## 2024-02-05 NOTE — ASSESSMENT & PLAN NOTE
Oncology notes reviewed.  Will get labs as per orders.  Encouraged follow-up with [place] [facility] for specialty care as per oncology plan.

## 2024-02-05 NOTE — ASSESSMENT & PLAN NOTE
Severe, does meet the qualification of disabled dependent.  Not able to live on own or make his own decisions.  He is not communicative and needs assistance for completion of all activities of daily living.  Paperwork for insurance, and state completed today attesting to his disabled dependent status

## 2024-02-05 NOTE — PROGRESS NOTES
Preventive Care Visit  [facility]  Feb 5, 2024    Assessment & Plan   Problem List Items Addressed This Visit       Autistic Disorder     Severe, does meet the qualification of disabled dependent.  Not able to live on own or make his own decisions.  He is not communicative and needs assistance for completion of all activities of daily living.  Paperwork for insurance, and state completed today attesting to his disabled dependent status         Seizure disorder (H)     Specialty notes reviewed.  Doing well on current medications which does include Keppra and Lamictal.  Continue current treatment as well as the Diastat as needed for seizure activity.  Continue follow-up with neurology.         B-cell acute lymphoblastic leukemia (ALL) (H)     Oncology notes reviewed.  Will get labs as per orders.  Encouraged follow-up with [place] [facility] for specialty care as per oncology plan.         Relevant Orders    Comprehensive metabolic panel    Avascular bone necrosis (H)     Orthopedic notes reviewed.  Does not seem to be causing pain or dysfunction currently.  Will plan to hold on hip replacement or further intervention until causing problem for patient given his relative nonambulatory/very limited ambulatory status         Does not speak     Has communication pattern with parents and siblings.         Hyperlipemia     Doing well with diet.  Will recheck levels and adjust treatment as needed         Relevant Orders    Lipid panel reflex to direct LDL Non-fasting    Incontinence     Using adult diapers.  Continue current.         Anemia of chronic disease     Recheck levels and adjust treatment as needed         Relevant Orders    CBC with Platelets and Reflex to Iron Studies (Completed)    Daytime sleepiness     Improved significantly with behavioral changes in the household of opening shades during the day and drying down nights.  Continue current  behavioral adaptations.         Relevant Orders    TSH with free T4 reflex     Other Visit Diagnoses       Routine general medical examination at a health care facility    -  Primary    Need for vaccination        Relevant Orders    Pneumococcal 20 Valent Conjugate (Prevnar 20) (Completed)    INFLUENZA VACCINE IM > 6 MONTHS VALENT IIV4 (AFLURIA/FLUZONE) (Completed)    COVID-19 12+ (2023-24) (PFIZER) (Completed)            Counseling  Appropriate preventive services were discussed with this patient, including applicable screening as appropriate for fall prevention, nutrition, physical activity, Tobacco-use cessation, weight loss and cognition.  Checklist reviewing preventive services available has been given to the patient.  Reviewed patient's diet, addressing concerns and/or questions.   The patient was instructed to see the dentist every 6 months.   He is at risk for psychosocial distress and has been provided with information to reduce risk.     Patient has been advised of split billing requirements and indicates understanding: Yes  See Patient Instructions      Madeleine Mccann is a 29 year old, presenting for the following:  Physical        2/5/2024     3:30 PM   Additional Questions   Roomed by MAURI Corrales   Accompanied by Parents         2/5/2024     3:30 PM   Patient Reported Additional Medications   Patient reports taking the following new medications N/A        Health Care Directive  Patient has a Health Care Directive on file  Advance care planning document is on file and is current.    Much more awake and interactive today than he has been in the past 2 years.  Very happy.  Still noncommunicative and nonverbal.  Due to his autonomic neuropathy and developmental delay is he does require significant assistance for ambulation to include a stander, and even with that he is not individually mobilized currently.  Though they are work physical therapy to get him more stabilized which she is a significant  improvement from last year.  No concerns from family today on his overall health.  Specialty notes were reviewed today.          2/5/2024   General Health   How would you rate your overall physical health? Good   Feel stress (tense, anxious, or unable to sleep) Only a little   (!) STRESS CONCERN      2/5/2024   Nutrition   Three or more servings of calcium each day? (!) NO   Diet: Other   If other, please elaborate: Ramy has Autism and only eats what he can be persuaded to eat   How many servings of fruit and vegetables per day? (!) 0-1   How many sweetened beverages each day? 0-1         2/5/2024   Exercise   Days per week of moderate/strenous exercise 0 days   (!) EXERCISE CONCERN      2/5/2024   Social Factors   Frequency of gathering with friends or relatives More than three times a week   Worry food won't last until get money to buy more No   Food not last or not have enough money for food? No   Do you have housing?  Yes   Are you worried about losing your housing? No   Lack of transportation? No   Unable to get utilities (heat,electricity)? No         2/5/2024   Dental   Dentist two times every year? (!) NO         2/5/2024   TB Screening   Were you born outside of [place]?  No         Today's PHQ-2 Score:       2/5/2024     3:06 PM   PHQ-2 ( 1999 [employer])   Q1: Little interest or pleasure in doing things 0   Q2: Feeling down, depressed or hopeless 0   PHQ-2 Score 0   Q1: Little interest or pleasure in doing things Not at all   Q2: Feeling down, depressed or hopeless Not at all   PHQ-2 Score 0           2/5/2024   Substance Use   Alcohol more than 3/day or more than 7/wk Not Applicable   Do you use any other substances recreationally? (!) CANNABIS PRODUCTS    (!) PRESCRIPTION DRUGS     Social History     Tobacco Use    Smoking status: Never     Passive exposure: Never    Smokeless tobacco: Never   Vaping Use    Vaping Use: Never used   Substance Use Topics    Alcohol use: Never    Drug use: Never           2/5/2024  "  STI Screening   New sexual partner(s) since last STI/HIV test? No         2/5/2024   Contraception/Family Planning   Questions about contraception or family planning No        Reviewed and updated as needed this visit by Provider   Tobacco  Allergies  Meds  Problems  Med Hx  Surg Hx  Fam Hx              Review of Systems   All other systems reviewed and are negative.         Objective    Exam  /81 (BP Location: Right arm, Patient Position: Sitting, Cuff Size: Adult Large)   Pulse 90   Resp 16   Ht 1.905 m (6' 3\")   Wt 79.2 kg (174 lb 8 oz)   SpO2 97%   BMI 21.81 kg/m     Estimated body mass index is 21.81 kg/m  as calculated from the following:    Height as of this encounter: 1.905 m (6' 3\").    Weight as of this encounter: 79.2 kg (174 lb 8 oz).    Physical Exam  Vitals and nursing note reviewed.   Constitutional:       General: He is not in acute distress.     Appearance: Normal appearance. He is not ill-appearing.   HENT:      Head: Normocephalic and atraumatic.      Right Ear: Tympanic membrane, ear canal and external ear normal.      Left Ear: Tympanic membrane, ear canal and external ear normal.      Mouth/Throat:      Pharynx: No oropharyngeal exudate or posterior oropharyngeal erythema.   Eyes:      Extraocular Movements: Extraocular movements intact.      Conjunctiva/sclera: Conjunctivae normal.   Cardiovascular:      Rate and Rhythm: Normal rate and regular rhythm.      Pulses: Normal pulses.      Heart sounds: Normal heart sounds.   Pulmonary:      Effort: Pulmonary effort is normal.      Breath sounds: Normal breath sounds. No wheezing, rhonchi or rales.   Musculoskeletal:      Cervical back: Normal range of motion.      Right lower leg: No edema.      Left lower leg: No edema.   Skin:     Capillary Refill: Capillary refill takes less than 2 seconds.   Neurological:      Mental Status: He is alert. Mental status is at baseline.   Psychiatric:         Attention and Perception: " Attention normal.         Mood and Affect: Mood normal.         Speech: Speech normal.         Signed Electronically by: Adrian Ferrer,

## 2024-02-05 NOTE — ASSESSMENT & PLAN NOTE
Orthopedic notes reviewed.  Does not seem to be causing pain or dysfunction currently.  Will plan to hold on hip replacement or further intervention until causing problem for patient given his relative nonambulatory/very limited ambulatory status

## 2024-02-05 NOTE — ASSESSMENT & PLAN NOTE
Improved significantly with behavioral changes in the household of opening shades during the day and drying down nights.  Continue current behavioral adaptations.

## 2024-02-05 NOTE — PATIENT INSTRUCTIONS
Preventive Care Advice   This is general advice given by our system to help you stay healthy. However, your care team may have specific advice just for you. Please talk to your care team about your preventive care needs.  Nutrition  Eat 5 or more servings of fruits and vegetables each day.  Try wheat bread, brown rice and whole grain pasta (instead of white bread, rice, and pasta).  Get enough calcium and vitamin D. Check the label on foods and aim for 100% of the RDA (recommended daily allowance).  Lifestyle  Exercise at least 150 minutes each week  (30 minutes a day, 5 days a week).  Do muscle strengthening activities 2 days a week. These help control your weight and prevent disease.  No smoking.  Wear sunscreen to prevent skin cancer.  Have a dental exam and cleaning every 6 months.  Yearly exams  See your health care team every year to talk about:  Any changes in your health.  Any medicines your care team has prescribed.  Preventive care, family planning, and ways to prevent chronic diseases.  Shots (vaccines)   HPV shots (up to age 26), if you've never had them before.  Hepatitis B shots (up to age 59), if you've never had them before.  COVID-19 shot: Get this shot when it's due.  Flu shot: Get a flu shot every year.  Tetanus shot: Get a tetanus shot every 10 years.  Pneumococcal, hepatitis A, and RSV shots: Ask your care team if you need these based on your risk.  Shingles shot (for age 50 and up)  General health tests  Diabetes screening:  Starting at age 35, Get screened for diabetes at least every 3 years.  If you are younger than age 35, ask your care team if you should be screened for diabetes.  Cholesterol test: At age 39, start having a cholesterol test every 5 years, or more often if advised.  Bone density scan (DEXA): At age 50, ask your care team if you should have this scan for osteoporosis (brittle bones).  Hepatitis C: Get tested at least once in your life.  STIs (sexually transmitted  infections)  Before age 24: Ask your care team if you should be screened for STIs.  After age 24: Get screened for STIs if you're at risk. You are at risk for STIs (including HIV) if:  You are sexually active with more than one person.  You don't use condoms every time.  You or a partner was diagnosed with a sexually transmitted infection.  If you are at risk for HIV, ask about PrEP medicine to prevent HIV.  Get tested for HIV at least once in your life, whether you are at risk for HIV or not.  Cancer screening tests  Cervical cancer screening: If you have a cervix, begin getting regular cervical cancer screening tests starting at age 21.  Breast cancer scan (mammogram): If you've ever had breasts, begin having regular mammograms starting at age 40. This is a scan to check for breast cancer.  Colon cancer screening: It is important to start screening for colon cancer at age 45.  Have a colonoscopy test every 10 years (or more often if you're at risk) Or, ask your provider about stool tests like a FIT test every year or Cologuard test every 3 years.  To learn more about your testing options, visit:   https://www.[address]/393498.pdf.  For help making a decision, visit:   https://bit.ly/ow05903.  Prostate cancer screening test: If you have a prostate, ask your care team if a prostate cancer screening test (PSA) at age 55 is right for you.  Lung cancer screening: If you are a current or former smoker ages 50 to 80, ask your care team if ongoing lung cancer screenings are right for you.  For informational purposes only. Not to replace the advice of your health care provider. Copyright   2023 [employer]. All rights reserved. Clinically reviewed by the [facility] Transitions Program. [employer] 826083 - REV 01/24.

## 2024-02-05 NOTE — ASSESSMENT & PLAN NOTE
Specialty notes reviewed.  Doing well on current medications which does include Keppra and Lamictal.  Continue current treatment as well as the Diastat as needed for seizure activity.  Continue follow-up with neurology.

## 2024-02-06 LAB
ALBUMIN SERPL BCG-MCNC: 4.7 G/DL (ref 3.5–5.2)
ALP SERPL-CCNC: 152 U/L (ref 40–150)
ALT SERPL W P-5'-P-CCNC: 34 U/L (ref 0–70)
ANION GAP SERPL CALCULATED.3IONS-SCNC: 13 MMOL/L (ref 7–15)
AST SERPL W P-5'-P-CCNC: 25 U/L (ref 0–45)
BILIRUB SERPL-MCNC: 0.2 MG/DL
BUN SERPL-MCNC: 12.3 MG/DL (ref 6–20)
CALCIUM SERPL-MCNC: 9.8 MG/DL (ref 8.6–10)
CHLORIDE SERPL-SCNC: 100 MMOL/L (ref 98–107)
CHOLEST SERPL-MCNC: 195 MG/DL
CREAT SERPL-MCNC: 0.65 MG/DL (ref 0.67–1.17)
DEPRECATED HCO3 PLAS-SCNC: 25 MMOL/L (ref 22–29)
EGFRCR SERPLBLD CKD-EPI 2021: >90 ML/MIN/1.73M2
FASTING STATUS PATIENT QL REPORTED: ABNORMAL
GLUCOSE SERPL-MCNC: 200 MG/DL (ref 70–99)
HDLC SERPL-MCNC: 26 MG/DL
LDLC SERPL CALC-MCNC: ABNORMAL MG/DL
LDLC SERPL DIRECT ASSAY-MCNC: 74 MG/DL
NONHDLC SERPL-MCNC: 169 MG/DL
POTASSIUM SERPL-SCNC: 3.9 MMOL/L (ref 3.4–5.3)
PROT SERPL-MCNC: 7.9 G/DL (ref 6.4–8.3)
SODIUM SERPL-SCNC: 138 MMOL/L (ref 135–145)
TRIGL SERPL-MCNC: 755 MG/DL
TSH SERPL DL<=0.005 MIU/L-ACNC: 3.13 UIU/ML (ref 0.3–4.2)

## 2024-04-08 DIAGNOSIS — G90.9 AUTONOMIC NEUROPATHY: ICD-10-CM

## 2024-04-08 RX ORDER — RISPERIDONE 3 MG/1
TABLET ORAL
Qty: 225 TABLET | Refills: 2 | Status: SHIPPED | OUTPATIENT
Start: 2024-04-08 | End: 2024-10-03

## 2024-04-12 ENCOUNTER — TELEPHONE (OUTPATIENT)
Dept: FAMILY MEDICINE | Facility: CLINIC | Age: 29
End: 2024-04-12
Payer: COMMERCIAL

## 2024-04-12 DIAGNOSIS — J30.1 SEASONAL ALLERGIC RHINITIS DUE TO POLLEN: Primary | ICD-10-CM

## 2024-04-12 RX ORDER — MONTELUKAST SODIUM 10 MG/1
10 TABLET ORAL AT BEDTIME
Qty: 90 TABLET | Refills: 1 | Status: SHIPPED | OUTPATIENT
Start: 2024-04-12 | End: 2024-10-02

## 2024-04-12 NOTE — TELEPHONE ENCOUNTER
Called Patient's father Chavo and relayed Dr Ferrer's message below.  Chavo understands and has no questions.

## 2024-04-12 NOTE — TELEPHONE ENCOUNTER
General Call      Reason for Call:  allergy medication     What are your questions or concerns:  Dad is  calling  requesting  a stronger medication for Ramy for his allergies. He is currently on Zyrtec.      Date of last appointment with provider: 2/5/24    Okay to leave a detailed message?: Yes at Home number on file [phone] (home)

## 2024-04-12 NOTE — TELEPHONE ENCOUNTER
There is really nothing that is stronger however there is a joint hips.  Continue the Zyrtec at current dosing.  I have sent over a prescription for montelukast sodium which I would like him to take 1 tab nightly.

## 2024-08-01 ENCOUNTER — NURSE TRIAGE (OUTPATIENT)
Dept: FAMILY MEDICINE | Facility: CLINIC | Age: 29
End: 2024-08-01
Payer: COMMERCIAL

## 2024-08-01 NOTE — TELEPHONE ENCOUNTER
Nurse Triage SBAR    Is this a 2nd Level Triage? NO    Situation:  Outbreak rash (pimple like) started yesterday and worsening today.     Background:  Incoming call from dad, wanting advise. No CTC on file, but was told pt does not speak and parents have been with him at every appointment. Father was with patient during initial call.     Assessment:       Denies any changes to foods, medications, lifestyle.   Pt been eating Pizza yesterday and today.     1. APPEARANCE of RASH: red Pimples-like, uniform, No blister, no drainage, does not look purple   2. LOCATION:  upper right side of hand and arm.   3. NUMBER: multiple, unable to count.   4. SIZE:   7-8 inches   5. ONSET:  yesterday (last night)   6. ITCHING: NA, patient is non-verbal   7. PAIN:  NA, patient seem to be relaxing and not being bother by it.   8. OTHER SYMPTOMS: Denies fever, breathing issues or difficulties.     Have to tried anything at this time.     Wondering if there is a cream or medication for patient to take.     Protocol Recommended Disposition:   See in Office Today or Tomorrow    Recommendation:  Per disposition, scheduled pt for tomorrow with approval slots.     Routed to provider as FYI per father request.     Does the patient meet one of the following criteria for ADS visit consideration? 16+ years old, with an [facility] PCP     TIP  Providers, please consider if this condition is appropriate for management at one of our Acute and Diagnostic Services sites.     If patient is a good candidate, please use dotphrase <dot>triageresponse and select Refer to ADS to document.     Reason for Disposition   Pimples (localized) and no improvement after using CARE ADVICE    Additional Information   Negative: Sounds like a life-threatening emergency to the triager   Negative: Athlete's Foot suspected (i.e., itchy rash between the toes)   Negative: Insect bite(s) suspected   Negative: Jock Itch suspected (i.e., itchy rash on inner thighs near genital  area)   Negative: Localized lump (or swelling) without redness or rash   Negative: MPOX SUSPECTED (e.g., direct skin contact such as sex, recent travel to [place] or [place]) and any SYMPTOMS OF MPOX (e.g., rash, fever, muscle aches, or swollen lymph nodes)   Negative: At risk for Mpox (men-who-have-sex-with-men) and possible exposure (e.g., multiple sex partners in past 21 days) and ANY SYMPTOMS OF MPOX (e.g., rash, fever, muscle aches, or swollen lymph nodes)   Negative: Poison ivy, oak, or sumac rash suspected (e.g., itchy rash after contact with poison ivy)   Negative: Rash of female genital area (e.g., labia, vagina, vulva)   Negative: Rash of male genital area (e.g., penis, scrotum)   Negative: Redness of immunization site   Negative: Shingles suspected (i.e., painful rash, multiple small blisters grouped together in one area of body; dermatomal distribution)   Negative: Small spot, skin growth, or mole   Negative: Wound infection suspected (i.e., pain, spreading redness, or pus; in a cut, puncture, scrape or sutured wound)   Negative: Fever and localized purple or blood-colored spots or dots that are not from injury or friction   Negative: Fever and localized rash is very painful   Negative: Patient sounds very sick or weak to the triager   Negative: Looks like a boil, infected sore, deep ulcer, or other infected rash (spreading redness, pus)   Negative: Painful rash with multiple small blisters grouped together (i.e., dermatomal distribution or 'band' or 'stripe')   Negative: Localized rash is very painful (no fever)   Negative: Localized purple or blood-colored spots or dots that are not from injury or friction (no fever)   Negative: Lyme disease suspected (e.g., bull's-eye rash or tick bite / exposure)   Negative: Patient wants to be seen   Negative: Tender bumps in armpits    Protocols used: Rash or Redness - [id number] SPANGLER RN

## 2024-10-02 ENCOUNTER — MEDICAL CORRESPONDENCE (OUTPATIENT)
Dept: HEALTH INFORMATION MANAGEMENT | Facility: CLINIC | Age: 29
End: 2024-10-02
Payer: COMMERCIAL

## 2024-10-02 DIAGNOSIS — G90.9 AUTONOMIC NEUROPATHY: ICD-10-CM

## 2024-10-02 DIAGNOSIS — J30.1 SEASONAL ALLERGIC RHINITIS DUE TO POLLEN: ICD-10-CM

## 2024-10-02 RX ORDER — MONTELUKAST SODIUM 10 MG/1
1 TABLET ORAL AT BEDTIME
Qty: 90 TABLET | Refills: 0 | Status: SHIPPED | OUTPATIENT
Start: 2024-10-02

## 2024-10-03 RX ORDER — RISPERIDONE 3 MG/1
TABLET ORAL
Qty: 225 TABLET | Refills: 1 | Status: SHIPPED | OUTPATIENT
Start: 2024-10-03

## 2024-11-04 ENCOUNTER — HOSPITAL ENCOUNTER (OUTPATIENT)
Dept: CARDIOLOGY | Facility: CLINIC | Age: 29
Discharge: HOME | End: 2024-11-04
Attending: NURSE PRACTITIONER | Admitting: NURSE PRACTITIONER
Payer: COMMERCIAL

## 2024-11-04 DIAGNOSIS — Z01.818 EXAMINATION PRIOR TO CHEMOTHERAPY: ICD-10-CM

## 2024-11-04 LAB — LVEF ECHO: NORMAL

## 2024-11-04 PROCEDURE — 93325 DOPPLER ECHO COLOR FLOW MAPG: CPT | Mod: 26 | Performed by: INTERNAL MEDICINE

## 2024-11-04 PROCEDURE — 93321 DOPPLER ECHO F-UP/LMTD STD: CPT | Mod: 26 | Performed by: INTERNAL MEDICINE

## 2024-11-04 PROCEDURE — 93308 TTE F-UP OR LMTD: CPT | Mod: 26 | Performed by: INTERNAL MEDICINE

## 2024-11-04 PROCEDURE — 93308 TTE F-UP OR LMTD: CPT

## 2024-11-05 ENCOUNTER — OFFICE VISIT (OUTPATIENT)
Dept: FAMILY MEDICINE | Facility: CLINIC | Age: 29
End: 2024-11-05
Payer: COMMERCIAL

## 2024-11-05 VITALS
BODY MASS INDEX: 21.7 KG/M2 | HEART RATE: 123 BPM | HEIGHT: 75 IN | RESPIRATION RATE: 18 BRPM | WEIGHT: 174.5 LBS | OXYGEN SATURATION: 97 %

## 2024-11-05 DIAGNOSIS — J40 BRONCHITIS: Primary | ICD-10-CM

## 2024-11-05 PROCEDURE — 99213 OFFICE O/P EST LOW 20 MIN: CPT | Mod: 25 | Performed by: NURSE PRACTITIONER

## 2024-11-05 PROCEDURE — 90471 IMMUNIZATION ADMIN: CPT | Performed by: NURSE PRACTITIONER

## 2024-11-05 PROCEDURE — 90656 IIV3 VACC NO PRSV 0.5 ML IM: CPT | Performed by: NURSE PRACTITIONER

## 2024-11-05 RX ORDER — PREDNISONE 20 MG/1
40 TABLET ORAL DAILY
Qty: 10 TABLET | Refills: 0 | Status: SHIPPED | OUTPATIENT
Start: 2024-11-05 | End: 2024-11-10

## 2024-11-05 NOTE — PROGRESS NOTES
"Assessment & Plan     Bronchitis  Lungs overall clear on exam and patient is well appearing with no fevers-I don't think we are looking at pneumonia but based on patient's history it would be reasonable to treat as previously.     Will treat with steroid. I did discuss with dad this could cause agitation-dad plans to use medical marijuana if agitated.    If worsening or not improving recommend patient follow up with me or PCP. May benefit from chest xray.      - predniSONE (DELTASONE) 20 MG tablet; Take 2 tablets (40 mg) by mouth daily for 5 days.                Subjective   Ramy is a 29 year old, presenting for the following health issues:  Nasal Congestion        11/5/2024     8:52 AM   Additional Questions   Roomed by DEV Velazquez     History of Present Illness       Reason for visit:  Muscus in chest  Symptom onset:  3-7 days ago  Symptoms include:  Mucus in chest  Symptom intensity:  Moderate  Symptom progression:  Staying the same   He is taking medications regularly.     Patient is non verbal-patient's dad reports last time with similar symptoms was given steroid and seemed helpful.                    Objective    Pulse (!) 123   Resp 18   Ht 1.905 m (6' 3\")   Wt 79.2 kg (174 lb 8 oz)   SpO2 97%   BMI 21.81 kg/m    Body mass index is 21.81 kg/m .  Physical Exam  Cardiovascular:      Rate and Rhythm: Regular rhythm. Tachycardia present.   Pulmonary:      Effort: Pulmonary effort is normal.      Breath sounds: Normal breath sounds.   Neurological:      Mental Status: He is alert.   Psychiatric:         Behavior: Behavior is agitated.            No results found for any visits on 11/05/24.        Signed Electronically by: MEREDITH ALLISON CNP    "

## 2024-11-11 ENCOUNTER — TRANSFERRED RECORDS (OUTPATIENT)
Dept: HEALTH INFORMATION MANAGEMENT | Facility: CLINIC | Age: 29
End: 2024-11-11
Payer: COMMERCIAL

## 2025-01-06 ENCOUNTER — PATIENT OUTREACH (OUTPATIENT)
Dept: CARE COORDINATION | Facility: CLINIC | Age: 30
End: 2025-01-06
Payer: COMMERCIAL

## 2025-01-20 ENCOUNTER — PATIENT OUTREACH (OUTPATIENT)
Dept: CARE COORDINATION | Facility: CLINIC | Age: 30
End: 2025-01-20
Payer: COMMERCIAL

## 2025-01-21 DIAGNOSIS — J30.1 SEASONAL ALLERGIC RHINITIS DUE TO POLLEN: ICD-10-CM

## 2025-01-22 RX ORDER — MONTELUKAST SODIUM 10 MG/1
1 TABLET ORAL AT BEDTIME
Qty: 30 TABLET | Refills: 0 | Status: SHIPPED | OUTPATIENT
Start: 2025-01-22

## 2025-03-10 ENCOUNTER — OFFICE VISIT (OUTPATIENT)
Dept: FAMILY MEDICINE | Facility: CLINIC | Age: 30
End: 2025-03-10
Payer: COMMERCIAL

## 2025-03-10 VITALS
TEMPERATURE: 98.8 F | SYSTOLIC BLOOD PRESSURE: 113 MMHG | RESPIRATION RATE: 11 BRPM | OXYGEN SATURATION: 97 % | HEART RATE: 101 BPM | DIASTOLIC BLOOD PRESSURE: 73 MMHG

## 2025-03-10 DIAGNOSIS — Z76.0 ENCOUNTER FOR MEDICATION REFILL: ICD-10-CM

## 2025-03-10 DIAGNOSIS — E11.9 TYPE 2 DIABETES MELLITUS WITHOUT COMPLICATION, WITHOUT LONG-TERM CURRENT USE OF INSULIN (H): ICD-10-CM

## 2025-03-10 DIAGNOSIS — E78.2 MIXED HYPERLIPIDEMIA: ICD-10-CM

## 2025-03-10 DIAGNOSIS — J30.1 SEASONAL ALLERGIC RHINITIS DUE TO POLLEN: ICD-10-CM

## 2025-03-10 DIAGNOSIS — Z13.1 SCREENING FOR DIABETES MELLITUS: ICD-10-CM

## 2025-03-10 DIAGNOSIS — Z00.00 ROUTINE GENERAL MEDICAL EXAMINATION AT A HEALTH CARE FACILITY: Primary | ICD-10-CM

## 2025-03-10 DIAGNOSIS — G90.9 AUTONOMIC NEUROPATHY: ICD-10-CM

## 2025-03-10 DIAGNOSIS — H61.23 BILATERAL IMPACTED CERUMEN: ICD-10-CM

## 2025-03-10 DIAGNOSIS — Z23 IMMUNIZATION DUE: ICD-10-CM

## 2025-03-10 LAB
EST. AVERAGE GLUCOSE BLD GHB EST-MCNC: 249 MG/DL
HBA1C MFR BLD: 10.3 % (ref 0–5.6)

## 2025-03-10 PROCEDURE — 80061 LIPID PANEL: CPT | Performed by: STUDENT IN AN ORGANIZED HEALTH CARE EDUCATION/TRAINING PROGRAM

## 2025-03-10 PROCEDURE — 91320 SARSCV2 VAC 30MCG TRS-SUC IM: CPT | Performed by: STUDENT IN AN ORGANIZED HEALTH CARE EDUCATION/TRAINING PROGRAM

## 2025-03-10 PROCEDURE — 99395 PREV VISIT EST AGE 18-39: CPT | Performed by: STUDENT IN AN ORGANIZED HEALTH CARE EDUCATION/TRAINING PROGRAM

## 2025-03-10 PROCEDURE — 99214 OFFICE O/P EST MOD 30 MIN: CPT | Mod: 25 | Performed by: STUDENT IN AN ORGANIZED HEALTH CARE EDUCATION/TRAINING PROGRAM

## 2025-03-10 PROCEDURE — 3078F DIAST BP <80 MM HG: CPT | Performed by: STUDENT IN AN ORGANIZED HEALTH CARE EDUCATION/TRAINING PROGRAM

## 2025-03-10 PROCEDURE — 83721 ASSAY OF BLOOD LIPOPROTEIN: CPT | Mod: 59 | Performed by: STUDENT IN AN ORGANIZED HEALTH CARE EDUCATION/TRAINING PROGRAM

## 2025-03-10 PROCEDURE — 90480 ADMN SARSCOV2 VAC 1/ONLY CMP: CPT | Performed by: STUDENT IN AN ORGANIZED HEALTH CARE EDUCATION/TRAINING PROGRAM

## 2025-03-10 PROCEDURE — 36415 COLL VENOUS BLD VENIPUNCTURE: CPT | Performed by: STUDENT IN AN ORGANIZED HEALTH CARE EDUCATION/TRAINING PROGRAM

## 2025-03-10 PROCEDURE — 3074F SYST BP LT 130 MM HG: CPT | Performed by: STUDENT IN AN ORGANIZED HEALTH CARE EDUCATION/TRAINING PROGRAM

## 2025-03-10 PROCEDURE — 83036 HEMOGLOBIN GLYCOSYLATED A1C: CPT | Performed by: STUDENT IN AN ORGANIZED HEALTH CARE EDUCATION/TRAINING PROGRAM

## 2025-03-10 PROCEDURE — 69209 REMOVE IMPACTED EAR WAX UNI: CPT | Mod: 50 | Performed by: STUDENT IN AN ORGANIZED HEALTH CARE EDUCATION/TRAINING PROGRAM

## 2025-03-10 RX ORDER — SERTRALINE HYDROCHLORIDE 100 MG/1
100 TABLET, FILM COATED ORAL DAILY
Qty: 90 TABLET | Refills: 3 | Status: SHIPPED | OUTPATIENT
Start: 2025-03-10

## 2025-03-10 RX ORDER — MONTELUKAST SODIUM 10 MG/1
1 TABLET ORAL AT BEDTIME
Qty: 30 TABLET | Refills: 0 | Status: SHIPPED | OUTPATIENT
Start: 2025-03-10

## 2025-03-10 RX ORDER — SULFAMETHOXAZOLE AND TRIMETHOPRIM 800; 160 MG/1; MG/1
1 TABLET ORAL
COMMUNITY
Start: 2025-03-07 | End: 2025-03-14

## 2025-03-10 RX ORDER — RISPERIDONE 3 MG/1
TABLET ORAL
Qty: 225 TABLET | Refills: 1 | Status: SHIPPED | OUTPATIENT
Start: 2025-03-10

## 2025-03-10 SDOH — HEALTH STABILITY: PHYSICAL HEALTH: ON AVERAGE, HOW MANY DAYS PER WEEK DO YOU ENGAGE IN MODERATE TO STRENUOUS EXERCISE (LIKE A BRISK WALK)?: 0 DAYS

## 2025-03-10 ASSESSMENT — SOCIAL DETERMINANTS OF HEALTH (SDOH): HOW OFTEN DO YOU GET TOGETHER WITH FRIENDS OR RELATIVES?: MORE THAN THREE TIMES A WEEK

## 2025-03-10 NOTE — PROGRESS NOTES
Preventive Care Visit  [facility] ISAIAS Kwan MD, Family Medicine  Mar 10, 2025      Assessment & Plan     Routine general medical examination at a health care facility  Patient is back in his normal state of health after his recent hospitalization.  We reviewed personal, family and social history together and the chart was updated. Problem list and medication were reviewed and updated.      Autonomic neuropathy  Stable.  Provided refill today  - risperiDONE (RISPERDAL) 3 MG tablet  Dispense: 225 tablet; Refill: 1    Seasonal allergic rhinitis due to pollen  Stable.  Provided refill today.  - montelukast (SINGULAIR) 10 MG tablet  Dispense: 30 tablet; Refill: 0    Mixed hyperlipidemia  History of mixed hyperlipidemia, for which he takes omega-3 fatty acids.  Will recheck today.  - Lipid panel reflex to direct LDL Non-fasting    Bilateral impacted cerumen  Ear lavage done today.  - OR REMOVAL IMPACTED CERUMEN IRRIGATION/LVG UNILAT    Immunization due  COVID-19 vaccine    Encounter for medication refill  - sertraline (ZOLOFT) 100 MG tablet  Dispense: 90 tablet; Refill: 3    Screening for diabetes mellitus  Sugar levels running high while he was in the hospital, plan to screen for diabetes today.  - Hemoglobin A1c    Addendum: Patient's A1c came back at 10.3.  This is a new diagnosis of diabetes for him (in conjunction with the very high blood glucose levels reported while he was in the hospital).  Given the complexity of his case, placed referral to endocrinology and diabetes education.      The longitudinal plan of care for the diagnosis(es)/condition(s) as documented were addressed during this visit. Due to the added complexity in care, I will continue to support Ramy in the subsequent management and with ongoing continuity of care.      Patient has been advised of split billing requirements and indicates understanding: Yes      Counseling  Appropriate preventive services were addressed  with this patient via screening, questionnaire, or discussion as appropriate for fall prevention, nutrition, physical activity, Tobacco-use cessation, social engagement, weight loss and cognition.  Checklist reviewing preventive services available has been given to the patient.  Reviewed patient's diet, addressing concerns and/or questions.   The patient was instructed to see the dentist every 6 months.       Madeleine Mccann is a 30 year old, presenting for the following:  Establish Care and Refill Request        3/10/2025     2:04 PM   Additional Questions   Roomed by Sharona GUILLORY   Accompanied by Dad          HPI    Patient is here with his dad who reports that they need to establish care with a new provider    Patient was recently in the hospital with pyelonephritis.  Dad said that they noticed that his sugar levels were running high in the hospital, and so he is wondering if he has diabetes.  His mom has type 2 diabetes    Dad also wants me to take a look at a sore on Ramy's back    Advance Care Planning  Patient has a Health Care Directive on file  Advance care planning document is on file but is outdated.  Patient encouraged to update.      3/10/2025   General Health   How would you rate your overall physical health? Good   Feel stress (tense, anxious, or unable to sleep) Patient declined         3/10/2025   Nutrition   Three or more servings of calcium each day? (!) NO   Diet: Other   If other, please elaborate: only eats a few things like pizza bagels jelly sandwiches   How many servings of fruit and vegetables per day? (!) 0-1   How many sweetened beverages each day? 0-1         3/10/2025   Exercise   Days per week of moderate/strenous exercise 0 days   (!) EXERCISE CONCERN      3/10/2025   Social Factors   Frequency of gathering with friends or relatives More than three times a week   Worry food won't last until get money to buy more No   Food not last or not have enough money for food? No   Do you have  "housing? (Housing is defined as stable permanent housing and does not include staying ouside in a car, in a tent, in an abandoned building, in an overnight shelter, or couch-surfing.) Yes   Are you worried about losing your housing? No   Lack of transportation? No   Unable to get utilities (heat,electricity)? No         3/10/2025   Dental   Dentist two times every year? (!) NO           2/5/2024   TB Screening   Were you born outside of the [place]? No           Today's PHQ-2 Score:       3/10/2025     1:42 PM   PHQ-2 ( 1999 Pfizer)   PHQ-2 Score Incomplete           3/10/2025   Substance Use   Alcohol more than 3/day or more than 7/wk Not Applicable   Do you use any other substances recreationally? No     Social History     Tobacco Use    Smoking status: Never     Passive exposure: Never    Smokeless tobacco: Never   Vaping Use    Vaping status: Never Used   Substance Use Topics    Alcohol use: Never    Drug use: Never           3/10/2025   STI Screening   New sexual partner(s) since last STI/HIV test? No         3/10/2025   Contraception/Family Planning   Questions about contraception or family planning No        Reviewed and updated as needed this visit by Provider   Tobacco  Allergies  Meds  Problems  Med Hx  Surg Hx  Fam Hx                     Objective    Exam  /73   Pulse 101   Temp 98.8  F (37.1  C) (Temporal)   Resp 11   SpO2 97%    Estimated body mass index is 21.81 kg/m  as calculated from the following:    Height as of 11/5/24: 1.905 m (6' 3\").    Weight as of 11/5/24: 79.2 kg (174 lb 8 oz).    Physical Exam  GENERAL: Pleasant, alert and no distress  EYES: Eyes grossly normal to inspection, PERRL and conjunctivae and sclerae normal  HENT: ear canals completely occluded by cerumen  NECK: no adenopathy, no asymmetry, masses, or scars  RESP: lungs clear to auscultation - no rales, rhonchi or wheezes  CV: regular rate and rhythm, normal S1 S2, no S3 or S4, no murmur, click or rub, no peripheral " edema  ABDOMEN: soft, nontender, no hepatosplenomegaly, no masses and bowel sounds normal  MS: no gross musculoskeletal defects noted, no edema  SKIN: ~6cm round erythematous scar on back, healing well, no signs of active infection  PSYCH: mentation appears at baseline, affect normal/bright    Signed Electronically by: Connie Kwan MD

## 2025-03-10 NOTE — PATIENT INSTRUCTIONS
Patient Education   Preventive Care Advice   This is general advice given by our system to help you stay healthy. However, your care team may have specific advice just for you. Please talk to your care team about your preventive care needs.  Nutrition  Eat 5 or more servings of fruits and vegetables each day.  Try wheat bread, brown rice and whole grain pasta (instead of white bread, rice, and pasta).  Get enough calcium and vitamin D. Check the label on foods and aim for 100% of the RDA (recommended daily allowance).  Lifestyle  Exercise at least 150 minutes each week  (30 minutes a day, 5 days a week).  Do muscle strengthening activities 2 days a week. These help control your weight and prevent disease.  No smoking.  Wear sunscreen to prevent skin cancer.  Have a dental exam and cleaning every 6 months.  Yearly exams  See your health care team every year to talk about:  Any changes in your health.  Any medicines your care team has prescribed.  Preventive care, family planning, and ways to prevent chronic diseases.  Shots (vaccines)   HPV shots (up to age 26), if you've never had them before.  Hepatitis B shots (up to age 59), if you've never had them before.  COVID-19 shot: Get this shot when it's due.  Flu shot: Get a flu shot every year.  Tetanus shot: Get a tetanus shot every 10 years.  Pneumococcal, hepatitis A, and RSV shots: Ask your care team if you need these based on your risk.  Shingles shot (for age 50 and up)  General health tests  Diabetes screening:  Starting at age 35, Get screened for diabetes at least every 3 years.  If you are younger than age 35, ask your care team if you should be screened for diabetes.  Cholesterol test: At age 39, start having a cholesterol test every 5 years, or more often if advised.  Bone density scan (DEXA): At age 50, ask your care team if you should have this scan for osteoporosis (brittle bones).  Hepatitis C: Get tested at least once in your life.  STIs (sexually  transmitted infections)  Before age 24: Ask your care team if you should be screened for STIs.  After age 24: Get screened for STIs if you're at risk. You are at risk for STIs (including HIV) if:  You are sexually active with more than one person.  You don't use condoms every time.  You or a partner was diagnosed with a sexually transmitted infection.  If you are at risk for HIV, ask about PrEP medicine to prevent HIV.  Get tested for HIV at least once in your life, whether you are at risk for HIV or not.  Cancer screening tests  Cervical cancer screening: If you have a cervix, begin getting regular cervical cancer screening tests starting at age 21.  Breast cancer scan (mammogram): If you've ever had breasts, begin having regular mammograms starting at age 40. This is a scan to check for breast cancer.  Colon cancer screening: It is important to start screening for colon cancer at age 45.  Have a colonoscopy test every 10 years (or more often if you're at risk) Or, ask your provider about stool tests like a FIT test every year or Cologuard test every 3 years.  To learn more about your testing options, visit:   .  For help making a decision, visit:   https://bit.ly/ln47538.  Prostate cancer screening test: If you have a prostate, ask your care team if a prostate cancer screening test (PSA) at age 55 is right for you.  Lung cancer screening: If you are a current or former smoker ages 50 to 80, ask your care team if ongoing lung cancer screenings are right for you.  For informational purposes only. Not to replace the advice of your health care provider. Copyright   2023 [employer]. All rights reserved. Clinically reviewed by the [facility] Transitions Program. [employer] [id number] - REV 01/24.

## 2025-03-11 LAB
CHOLEST SERPL-MCNC: 216 MG/DL
FASTING STATUS PATIENT QL REPORTED: NO
HDLC SERPL-MCNC: 27 MG/DL
LDLC SERPL CALC-MCNC: ABNORMAL MG/DL
LDLC SERPL DIRECT ASSAY-MCNC: 68 MG/DL
NONHDLC SERPL-MCNC: 189 MG/DL
TRIGL SERPL-MCNC: 1067 MG/DL

## 2025-03-13 ENCOUNTER — TELEPHONE (OUTPATIENT)
Dept: ENDOCRINOLOGY | Facility: CLINIC | Age: 30
End: 2025-03-13
Payer: COMMERCIAL

## 2025-03-13 NOTE — TELEPHONE ENCOUNTER
Left Voicemail (1st Attempt) for the patient to call back and schedule the following:    Appointment type: New Diabetes   Provider: Anyone that sees new diabetes pt's    Return date: within 1-2 weeks if possible   Specialty phone number: [phone]  Additional appointment(s) needed:  Additonal Notes: MICHAEL, Sol Velasquez x1   Nelli Watson, RN  [id number]  CDE referral in place.  Open DM spot or MARKIE within 1-2 weeks in this order:  1) PA  2) Fellow  3) Preferred DM providers  4) Other providers that see new diabetes patients    Examples:  5/23 Ahmed in person csc  5/23 [facility] virtual csc  5/27 Ahmed virtual csc  5/29 Rucatrinapeng in person csc  5/30 Ahmed in person csc    Please note that the above appointment(s) will require manual scheduling as they are marked as MARKIE and will not appear using auto search. Do not schedule the patient if another patient has already been scheduled in the requested appointment slot.     Gabriella Rangel on 3/13/2025 at 9:13 AM

## 2025-03-14 ENCOUNTER — TELEPHONE (OUTPATIENT)
Dept: EDUCATION SERVICES | Facility: CLINIC | Age: 30
End: 2025-03-14

## 2025-03-14 DIAGNOSIS — E11.9 TYPE 2 DIABETES MELLITUS WITHOUT COMPLICATION, WITHOUT LONG-TERM CURRENT USE OF INSULIN (H): Primary | ICD-10-CM

## 2025-03-14 NOTE — TELEPHONE ENCOUNTER
Patient's mom, Mildred, was wondering if metformin could be started for Ramy.  His recent BMP labs showed eGFR>60 for renal function, and everything looked WNL for hepatic panel. Only concern to starting Metformin is if you feel Ramy is at increased risk for lactic acidosis.    He is on Bactrim and there is a possible interaction with Bactrim, but sounds like Bactrim will be stopped on Monday so would plan to start on Tuesday if this seems appropriate.    Since it could be 1 month or more for Ramy to get in an person with Endocrinology, and since his PCP appears to be out of clinic for >3 months, was wondering if you feel it would be ok to start Metformin (or if different medication preferred).  Was worried about SGLT-2 with the urinary incontinence (skin infection may be more common) and GLP-1 since Ramy is at a lower BMI already (21.8).    If started, would plan to do immediate release Metformin so mom can cut in half, and begin 500 mg with dinner and if tolerated, add 500 mg with breakfast.    Thanks,  Juanita Frye,  LEIGH, LD, [facility]

## 2025-03-17 NOTE — TELEPHONE ENCOUNTER
PCP ok with trying Metformin:  Connie Kwan MD Brown, Kaydee E, RD; Janay Mccann PA-C  Cc: Nakul Casanova MD  Caller: Unspecified (3 days ago, 10:17 AM)    Hi there,    After reviewing the chart, I am okay with patient starting metformin.  If anybody else has any differing opinions, please let me know.    Connie Kwan MD    Plan: sent prescription for Metformin to [facility] as requested. Tried to call Mildred to let her know but no answer. Left vague message calling to follow up with her and will try again tomorrow.    Juanita Frye RDN, LD, [facility]

## 2025-03-18 NOTE — TELEPHONE ENCOUNTER
Says Berto harp is very thin crust and Ramy is eating without issue. They also found low-sugar Greek bars and some keto bars that are also being tolerated well by Ramy.     Did inform mom that Metformin was sent [facility] yesterday and reviewed plan to begin with 1 tablet at dinner and give it 3-7 days to see how Ramy is doing on it. If well tolerated and no side effects, ok to then add 1 tablet with breakfast.     Told mom would be able to increase Metformin if needed and to call in blood glucose if seeing high values once Ramy is taking 500 mg BID. Says she was also able to move up endocrinology appointment to 4/15. Mom denies any other questions or concerns at this time.    Juanita Frye,  LEIGH, LD, [facility] [place]

## 2025-03-18 NOTE — CONFIDENTIAL NOTE
RECORDS RECEIVED FROM: internal / ce    DATE RECEIVED: 4.15.25    NOTES (FOR ALL VISITS) STATUS DETAILS   OFFICE NOTES from referring provider internal    Connie Kwan MD      OFFICE NOTES from other specialist internal  3.14.25 Uday      MEDICATION LIST internal     IMAGING      XR (Chest) Ce  - 3.5.25    CT (HEAD/NECK/CHEST/ABDOMEN) Ce  HP- 3.5.25, 9.16.23    LABS     DIABETES: HBGA1C, CREATININE, FASTING LIPIDS, MICROALBUMIN URINE, POTASSIUM, TSH, T4    THYROID: TSH, T4, CBC, THYRODLONULIN, TOTAL T3, FREE T4, CALCITONIN, CEA Internal/ ce   HBGA1C- 3.10.25   Lipid- 3.10.25   Cbc- 3.5.25   Cmp- 2.5.24   TSH/T4- 2.5.24      Action 3.18.25 sv    Action Taken Image request sent to  for   CXR- 3.5.25  CT- 3.5.25, 9.16.23

## 2025-04-11 NOTE — PATIENT INSTRUCTIONS
New Diabetes Patient Info  Welcome to the Diabetes Optimization Program at the Endocrinology and Diabetes Clinic at [facility] and [place]!    Our Diabetes Optimization Program is here to provide you with a team-based, collaborative approach to optimize your diabetes management. The team is made up of Endocrinologists and Physician Assistants here at the Clinic, your Primary Care Physician, Certified Diabetes Educators, Registered Nurses, Medical Assistants, Emergency Medical Technicians and Visit Facilitators.     During your care with us, we promise to:   Work with you and your Primary Care Provider Dr. Kwan to optimize your diabetes management.   Provide you with the tools and support you need to achieve a healthier lifestyle  Help you to control your diabetes by offering new treatments, education, and support to improve your diabetes management  Help you graduate back to your primary care provider for ongoing care once your diabetes is under control      Endocrinology Clinics Phone Number: [phone]    [facility] Address:   [place] Address:     [address] [address]   [address] [address] [place] [id number]       - To start using the new medications as prescribed   - Regarding Metformin to increase to two tablets with breakfast and one tablet with dinner if well tolerated to increase to two tablets twice weekly after one week   - To get fasting lab test   - To get repeat A1c before the next visit   - To check blood sugar at different times before meal and at the bed time with average of 1-2 checks /day   - I referred you to the eye doctor   - I will see you back in 3 months

## 2025-04-11 NOTE — PROGRESS NOTES
Outcome for 04/11/25 12:47 PM: Left Voicemail   Jennifer Vidal MA  Outcome for 04/14/25 10:58 AM: Reached patient but requests call back at 3:30pm to get BG readings over the phone.  Jennifer Vidal MA  Outcome for 04/14/25 4:41 PM: Data obtained via phone and located below  Remberto Stevenson MA        Patient is showing 4/5 MNCM met. A1c not in range   Jennifer Vidal MA    DATE TIME READING   4/14 1:30PM 380 (after lunch) then 240 an hour later   4/13 8:15AM 120   4/12 Fasting in the morning 140   4/11 Fasting in the morning 120   4/10 Fasting in the morning 120   4/9 Fasting in the morning 90   4/8 Fasting in the morning 125   4/7 Fasting in the morning 140   4/6 Fasting in the morning 160   4/5 Fasting in the morning 180   4/4 Fasting in the morningFasting in the morning 205   4/3 Fasting in the morning 160   4/2 Fasting in the morning 140   4/1 Fasting in the morning 140

## 2025-04-15 ENCOUNTER — PRE VISIT (OUTPATIENT)
Dept: ENDOCRINOLOGY | Facility: CLINIC | Age: 30
End: 2025-04-15

## 2025-04-15 ENCOUNTER — VIRTUAL VISIT (OUTPATIENT)
Dept: ENDOCRINOLOGY | Facility: CLINIC | Age: 30
End: 2025-04-15
Attending: STUDENT IN AN ORGANIZED HEALTH CARE EDUCATION/TRAINING PROGRAM
Payer: COMMERCIAL

## 2025-04-15 ENCOUNTER — MEDICAL CORRESPONDENCE (OUTPATIENT)
Dept: HEALTH INFORMATION MANAGEMENT | Facility: CLINIC | Age: 30
End: 2025-04-15

## 2025-04-15 DIAGNOSIS — E11.9 TYPE 2 DIABETES MELLITUS WITHOUT COMPLICATION, WITHOUT LONG-TERM CURRENT USE OF INSULIN (H): ICD-10-CM

## 2025-04-15 DIAGNOSIS — E78.1 HYPERTRIGLYCERIDEMIA: Primary | ICD-10-CM

## 2025-04-15 PROCEDURE — 99417 PROLNG OP E/M EACH 15 MIN: CPT | Performed by: STUDENT IN AN ORGANIZED HEALTH CARE EDUCATION/TRAINING PROGRAM

## 2025-04-15 PROCEDURE — 98003 SYNCH AUDIO-VIDEO NEW HI 60: CPT | Performed by: STUDENT IN AN ORGANIZED HEALTH CARE EDUCATION/TRAINING PROGRAM

## 2025-04-15 PROCEDURE — G2211 COMPLEX E/M VISIT ADD ON: HCPCS | Performed by: STUDENT IN AN ORGANIZED HEALTH CARE EDUCATION/TRAINING PROGRAM

## 2025-04-15 PROCEDURE — 1126F AMNT PAIN NOTED NONE PRSNT: CPT | Mod: 95 | Performed by: STUDENT IN AN ORGANIZED HEALTH CARE EDUCATION/TRAINING PROGRAM

## 2025-04-15 RX ORDER — CHLORAL HYDRATE 500 MG
2 CAPSULE ORAL 2 TIMES DAILY
Qty: 300 CAPSULE | Refills: 3 | Status: SHIPPED | OUTPATIENT
Start: 2025-04-15

## 2025-04-15 RX ORDER — ROSUVASTATIN CALCIUM 10 MG/1
10 TABLET, COATED ORAL DAILY
Qty: 90 TABLET | Refills: 3 | Status: SHIPPED | OUTPATIENT
Start: 2025-04-15

## 2025-04-15 RX ORDER — METFORMIN HYDROCHLORIDE 500 MG/1
1000 TABLET, EXTENDED RELEASE ORAL 2 TIMES DAILY WITH MEALS
Qty: 360 TABLET | Refills: 3 | Status: SHIPPED | OUTPATIENT
Start: 2025-04-15

## 2025-04-15 RX ORDER — SITAGLIPTIN 50 MG/1
50 TABLET ORAL DAILY
Qty: 90 TABLET | Refills: 3 | Status: SHIPPED | OUTPATIENT
Start: 2025-04-15

## 2025-04-15 NOTE — PROGRESS NOTES
Endocrinology Clinic Visit 4/15/2025      Video-Visit Details    Type of service:  Video Visit    Video Start Time (time video started): 11:08 AM    Video End Time (time video stopped): 11:50 AM    Originating Location (pt. Location): Home        Distant Location (provider location):  Off-site    Mode of Communication:  Video Conference via [employer] and [facility]    Physician has received verbal consent for a Video Visit from the patient? Yes from his mother who is his guardian.    95 minutes spent on the date of the encounter doing chart review, history and exam, documentation and further activities per the note           The longitudinal plan of care for the diagnosis(es)/condition(s) as documented were addressed during this visit. Due to the added complexity in care, I will continue to support Ramy in the subsequent management and with ongoing continuity of care.    NAME:  Ramy Beasley  PCP:  No primary care provider on file.  MRN:  [id number]  Reason for Consult: DM type II  Requesting Provider:  Connie Kwan    Chief Complaint     Chief Complaint   Patient presents with    Consult       History of Present Illness     Ramy Beasley is a 30 year old male who is seen in video visit for establishing care for DM type II management.  Has background history of seizure disorder from autonomic neuropathy metabolic encephalopathy allergic rhinitis sleep apnea vitamin D deficiency hyperlipidemia AVN nephrolithiasis history of pyelonephritis ALL anemia of chronic disease autistic disorder and mild ID with aggressive behavior.    Today his first visit with me.  Visit attended by his mother Mildred who is his guardian.  Ramy is nonverbal.    The patient was diagnosed with type 2 diabetes on 3/10/2025 with A1c of 10.3%.    Previous A1C in records reviewed.  Hemoglobin A1c of 10.3% on 3/10/2025.    Weight is 79 kg in November 2024.    Diabetes Care/Complications:   Nephropathy: No screening for albuminuria before,  creatinine 0.65 on 2/5/2024.  Retinopathy:non verbal   HLD:  history of hypertriglyceridemia no fasting labs available, 9/26/2022 triglyceride level 610, 2/5/2024 triglyceride level 755, 3/10/2025 triglyceride level 1067 all these labs were all done on the afternoon.  LDL on 3/10/2025 was 68.  On omega-3 1200 mg daily. No hx of pancreatitis   Neuropathy: Virtual visit foot exam was not done.  HTN: No history of hypertension.      Previous DM related Hospitalization:  None.    Current treatment strategy:   He met with the diabetes educator on 3/14/2025 advised to start on metformin with up titration of the dose.  Currently on metformin 500 mg twice daily   Tolerance: well tolerated     History of malnutrition: Has history of malnutrition was on tube feed in the past  History of pancreatitis: no  Personal/family history of MTC/MEN 2: no   History of urinary tract infection//urinary incontinence/fungal infection:hx of recurrent UTI he also has hx of urinary incontinence.     Blood Glucose Monitoring:     DATE TIME READING   4/14 1:30PM 380 (after lunch) then 240 an hour later   4/13 8:15AM 120   4/12 Fasting in the morning 140   4/11 Fasting in the morning 120   4/10 Fasting in the morning 120   4/9 Fasting in the morning 90   4/8 Fasting in the morning 125   4/7 Fasting in the morning 140   4/6 Fasting in the morning 160   4/5 Fasting in the morning 180   4/4 Fasting in the morningFasting in the morning 205   4/3 Fasting in the morning 160   4/2 Fasting in the morning 140   4/1 Fasting in the morning 140       Diet:   Breakfast:07:30 am  2 oz of cheese ,   Lunch: ultrathin crust pizza 4 slices   Dinner: no dinner   Snacks: protein bar fruits mixed nuts     Exercise: no         Family hx of DM: Mother: type 2 , maternal aunt: type 2       Pharmacy liaison consult on  4/15/2025:  Trulicity $25 per 30 days supply   Bydureon requires a prior auth     Zituvio $25 per 30 days   Saxagliptin $25 per 30 days   Tradjenta and  Nesina non-formulary/prior auth required     Pioglitazone $10 per 30 days     Dexcom and Freestlye non-form/prior auth required on this plan     Problem List     Patient Active Problem List   Diagnosis    Allergic rhinitis    Constipation    Autistic Disorder    Seizure disorder (H)    B-cell acute lymphoblastic leukemia (ALL) (H)    Ureteral stone    At high risk for falls    Autonomic neuropathy    Avascular bone necrosis (H)    Developmental delay    Does not speak    Aggressive behavior    High risk medication use    Hyperlipemia    Incontinence    Insomnia    Leukemia (H)    Metabolic encephalopathy    Mild intellectual disability    Sleep apnea    Vitamin D deficiency    Anemia of chronic disease    Daytime sleepiness        Medications     Current Outpatient Medications   Medication Sig Dispense Refill    blood glucose (NO BRAND SPECIFIED) lancets standard Use to test blood sugar 2 times daily or as directed. 400 each 1    blood glucose (NO BRAND SPECIFIED) test strip Use to test blood sugar 2 times daily as directed. 200 strip 3    blood glucose (NO BRAND SPECIFIED) test strip Use to test blood sugar 1 times daily (ALTERNATE: before breakfast, before dinner and 2 hours after the largest meal). To accompany: Blood Glucose Monitor Brands: per insurance. 50 strip 6    blood glucose monitoring (NO BRAND SPECIFIED) meter device kit Use to test blood sugar 1 times daily. Preferred blood glucose meter OR supplies to accompany: Blood Glucose Monitor Brands: per insurance. 1 kit 0    cetirizine (ZYRTEC) 10 MG tablet Take 10 mg by mouth daily      cholecalciferol 50 MCG (2000 UT) CAPS Take 1 capsule by mouth daily      clonazePAM (KLONOPIN) 0.5 MG tablet Take 2.5 tablets by mouth every evening   5    clotrimazole (LOTRIMIN) 1 % external cream Apply topically 2 times daily (Patient taking differently: Apply topically 2 times daily) 15 g 0    diazepam (DIASTAT) 20 MG GEL rectal gel INSERT 20MG INTO THE RECTUM AS NEEDED  FOR SEIZURES LASTING MorE THAN 5 MINUTES OR FOR CLUSTER SEIZURES Strength: 20 mg 1 each 1    fish oil-omega-3 fatty acids 1000 MG capsule Take 2 capsules (2 g) by mouth 2 times daily. 300 capsule 3    gabapentin (NEURONTIN) 300 MG capsule Take 600 mg by mouth at bedtime      lamoTRIgine (LAMICTAL) 100 MG tablet Take 5 tablets by mouth 2 times daily      levETIRAcetam (KEPPRA) 750 MG tablet Take 2 tablets by mouth 2 times daily      medical cannabis (Patient's own supply) every 24 hours      MELATONIN PO       metFORMIN (GLUCOPHAGE XR) 500 MG 24 hr tablet Take 2 tablets (1,000 mg) by mouth 2 times daily (with meals). 360 tablet 3    montelukast (SINGULAIR) 10 MG tablet Take 1 tablet (10 mg) by mouth at bedtime. 30 tablet 0    Multiple vitamin TABS Take 1 tablet by mouth daily      OXcarbazepine (TRILEPTAL) 150 MG tablet [OXCARBAZEPINE (TRILEPTAL) 150 MG TABLET] Take 1 tablet by mouth 2 (two) times a day.  12    OXcarbazepine (TRILEPTAL) 600 MG tablet [OXCARBAZEPINE (TRILEPTAL) 600 MG TABLET] Take 1 tablet by mouth 2 (two) times a day.  11    polyethylene glycol (MIRALAX) 17 gram packet [POLYETHYLENE GLYCOL (MIRALAX) 17 GRAM PACKET] Take 17 g by mouth daily.      QUEtiapine (SEROQUEL) 300 MG tablet Take 300 mg by mouth 2 times daily       risperiDONE (RISPERDAL) 3 MG tablet TAKE 1 TABLET BY MOUTH IN THE MORNING AND 1 & 1/2 (ONE & ONE-HALF) TABS AT BEDTIME 225 tablet 1    rosuvastatin (CRESTOR) 10 MG tablet Take 1 tablet (10 mg) by mouth daily. 90 tablet 3    SITagliptin (ZITUVIO) 50 MG TABS Take 50 mg by mouth daily. 90 tablet 3    thin (NO BRAND SPECIFIED) lancets Use with lancing device to check glucose 1 time daily per  direction. To accompany: Blood Glucose Monitor Brands: per insurance. 100 each 6    senna-docusate (SENOKOT-S/PERICOLACE) 8.6-50 MG tablet Take 1 tablet by mouth daily (Patient not taking: Reported on 4/15/2025)      sertraline (ZOLOFT) 100 MG tablet Take 1 tablet (100 mg) by mouth  daily. (Patient not taking: Reported on 4/15/2025) 90 tablet 3     No current facility-administered medications for this visit.        Allergies     Allergies   Allergen Reactions    Pamidronate Hives, Itching and Rash    Pegaspargase Unknown       Medical / Surgical History     Past Medical History:   Diagnosis Date    Acute lymphoblastic leukemia (ALL) (H)     Acute Otitis Media     Created by Conversion Replacement Utility updated for latest IMO load     Allergic rhinitis     Created by Conversion [facility] Annotation: Oct  1 2009 12:46PM - Roosevelt Monsivais: RAST positive  to  trees, grasses, molds 11/2003 Replacement Utility updated for latest IMO load     Autistic Disorder     Diarrhea     Created by Conversion     Epilepsy And Recurrent Seizures     Otitis Externa     Created by Conversion Replacement Utility updated for latest IMO load     Ureteral stone 01/14/2022     Past Surgical History:   Procedure Laterality Date    HC REMOVAL ADENOIDS,SECOND,<13 Y/O      Description: Adenoidectomy Secondary;  Proc Date: 12/01/2000;    HC REMOVE TONSILS/ADENOIDS,<13 Y/O      Description: Tonsillectomy With Adenoidectomy;  Proc Date: 02/01/2000;    LASER HOLMIUM LITHOTRIPSY URETER(S), INSERT STENT, COMBINED Left 1/26/2022    Procedure: CYSTOURETEROSCOPY, WITH LITHOTRIPSY USING LASER AND URETERAL LEFT STENT INSERTION;  Surgeon: Nakul Casanova MD;  [place] [facility] [place]       Social History     Social History     Socioeconomic History    Marital status: Single     Spouse name: Not on file    Number of children: 0    Years of education: 8    Highest education level: 8th grade   Occupational History    Occupation: Disabled   Tobacco Use    Smoking status: Never     Passive exposure: Never    Smokeless tobacco: Never   Vaping Use    Vaping status: Never Used   Substance and Sexual Activity    Alcohol use: Never    Drug use: Never    Sexual activity: Never   Other Topics Concern    Not on file   Social History Narrative    Not  on file     Social Drivers of Health     Financial Resource Strain: Low Risk  (3/10/2025)    Financial Resource Strain     Within the past 12 months, have you or your family members you live with been unable to get utilities (heat, electricity) when it was really needed?: No   Food Insecurity: Low Risk  (3/10/2025)    Food Insecurity     Within the past 12 months, did you worry that your food would run out before you got money to buy more?: No     Within the past 12 months, did the food you bought just not last and you didn t have money to get more?: No   Transportation Needs: Low Risk  (3/10/2025)    Transportation Needs     Within the past 12 months, has lack of transportation kept you from medical appointments, getting your medicines, non-medical meetings or appointments, work, or from getting things that you need?: No   Physical Activity: Unknown (3/10/2025)    Exercise Vital Sign     Days of Exercise per Week: 0 days     Minutes of Exercise per Session: Not on file   Stress: Patient Declined (3/10/2025)    [employer] [place] [employer] - Occupational Stress Questionnaire     Feeling of Stress : Patient declined   Social Connections: Unknown (3/10/2025)    Social Connection and Isolation Panel [NHANES]     Frequency of Communication with Friends and Family: Not on file     Frequency of Social Gatherings with Friends and Family: More than three times a week     Attends Adventism Services: Not on file     Active Member of Clubs or Organizations: Not on file     Attends Club or Organization Meetings: Not on file     Marital Status: Not on file   Interpersonal Safety: Unknown (11/5/2024)    Interpersonal Safety     Do you feel physically and emotionally safe where you currently live?: Patient unable to answer     Within the past 12 months, have you been hit, slapped, kicked or otherwise physically hurt by someone?: Patient unable to answer     Within the past 12 months, have you been humiliated or  "emotionally abused in other ways by your partner or ex-partner?: Patient unable to answer   Housing Stability: Low Risk  (3/10/2025)    Housing Stability     Do you have housing? : Yes     Are you worried about losing your housing?: No       Family History     Family History   Problem Relation Age of Onset    Diabetes Mother     No Known Problems Father     Bipolar Disorder Sister     Alcoholism Sister     Spina bifida Sister     Hydrocephalus Sister     Alzheimer Disease Maternal Grandmother     Heart Disease Maternal Grandfather     Emphysema Paternal Grandmother     No Known Problems Paternal Grandfather        ROS     12 ROS completed, pertinent positive and negative in HPI    Physical Exam   There were no vitals taken for this visit.   GENERAL: alert and no distress  EYES: Eyes grossly normal to inspection.  No discharge or erythema, or obvious scleral/conjunctival abnormalities.  RESP: No audible wheeze, cough, or visible cyanosis.    SKIN: Visible skin clear. No significant rash, abnormal pigmentation or lesions.     Labs/Imaging     Pertinent Labs were reviewed and discussed briefly.  Radiology Results were  reviewed.    Summary of recent findings:   Lab Results   Component Value Date    A1C 10.3 03/10/2025       TSH   Date Value Ref Range Status   02/05/2024 3.13 0.30 - 4.20 uIU/mL Final       Creatinine   Date Value Ref Range Status   02/05/2024 0.65 (L) 0.67 - 1.17 mg/dL Final       Recent Labs   Lab Test 03/10/25  1520 02/05/24  1644   CHOL 216* 195   HDL 27* 26*   LDL 68 74   TRIG 1,067* 755*       No results found for: \"[id number]\", \"[id number]\", \"[id number]\"    I personally reviewed the patient's outside records from [employer] EMR and Care Everywhere. Summary of pertinent findings in HPI.    Impression / Plan     1.  Newly diagnosed diabetes hemoglobin A1c 10.3%:  Was found to have A1c of 10.3% in March 2025 prior to that no known history of diabetes or prediabetes.  His BMI 21.  Has family history of " type 2 diabetes.  Was started on metformin recently currently taking IR form 500 mg twice daily well-tolerated no side effects.  He had BG readings for the last 2 weeks all fasting readings except 2 readings fasting readings above the goal.  He has history of autism he is nonverbal.  His mother Mildred is his main guardian she takes care of his medications.  She stated she would not be tolerating injectable medications.    He checks his BG fasting in the morning BG readings for the last 2 weeks were reviewed between  in the morning.    Plan:  - Will assess for the possibility of type I given his normal BMI by getting the antibodies panel for type I.  And to get C-peptide and glucose checked.  -To increase metformin dose gradually over the course of the next 2 weeks up to maximum tolerated dose or total of 2000 mg daily.  -To start sitagliptin 50 mg daily.  -Strips and lancets prescribed for him today.  - Referral to ophthalmologist placed for diabetic eye exam.  - Screening for albuminuria needs to be done also order is placed for him.    2.  Hypertriglyceridemia:  Currently on omega-3 1200 mg daily.  No history of pancreatitis.    Plan:  -To start Crestor 10 mg daily.  - To change the dose of vitamin D3 to 2 g twice daily.  To get fasting lipid panel with the next lab test.        Test and/or medications prescribed today:  Orders Placed This Encounter   Procedures    Albumin Random Urine Quantitative with Creat Ratio    Hemoglobin A1c    Lipid panel reflex to direct LDL Fasting    Glutamic acid decarboxylase (NELY) antibody    C-peptide    Glucose    Islet Antigen (IA-2) Autoantibody, Serum    Zinc Transporter 8 Antibody    Insulin antibody    Adult Eye  Referral         Follow up: 3 months    Note: Chart documentation done in part with Dragon Voice Recognition software. Although reviewed after completion, some word and grammatical errors may remain.  Please consider this when interpreting information in  this chart   ELIS Rowe  Endocrinology, Diabetes and Metabolism  [facility]

## 2025-04-15 NOTE — LETTER
4/15/2025       RE: Ramy Beasley  [address] [facility] [address]     Dear Colleague,    Thank you for referring your patient, Ramy Beasley, to the [facility] [place] [facility]. Please see a copy of my visit note below.    Outcome for 04/11/25 12:47 PM: Left Voicemail   Jennifer Vidal MA  Outcome for 04/14/25 10:58 AM: Reached patient but requests call back at 3:30pm to get BG readings over the phone.  Jennifer Vidal MA  Outcome for 04/14/25 4:41 PM: Data obtained via phone and located below  Remberto Stevenson MA        Patient is showing 4/5 MNCM met. A1c not in range   Jennifer Vidal MA    DATE TIME READING   4/14 1:30PM 380 (after lunch) then 240 an hour later   4/13 8:15AM 120   4/12 Fasting in the morning 140   4/11 Fasting in the morning 120   4/10 Fasting in the morning 120   4/9 Fasting in the morning 90   4/8 Fasting in the morning 125   4/7 Fasting in the morning 140   4/6 Fasting in the morning 160   4/5 Fasting in the morning 180   4/4 Fasting in the morningFasting in the morning 205   4/3 Fasting in the morning 160   4/2 Fasting in the morning 140   4/1 Fasting in the morning 140       Endocrinology Clinic Visit 4/15/2025      Video-Visit Details    Type of service:  Video Visit    Video Start Time (time video started): 11:08 AM    Video End Time (time video stopped): 11:50 AM    Originating Location (pt. Location): Home        Distant Location (provider location):  Off-site    Mode of Communication:  [facility]    Physician has received verbal consent for a Video Visit from the patient? Yes from his mother who is his guardian.    95 minutes spent on the date of the encounter doing chart review, history and exam, documentation and further activities per the note           The longitudinal plan of care for the diagnosis(es)/condition(s) as documented were addressed during  this visit. Due to the added complexity in care, I will continue to support Ramy in the subsequent management and with ongoing continuity of care.    NAME:  Ramy Beasley  PCP:  No primary care provider on file.  MRN:  [id number]  Reason for Consult: DM type II  Requesting Provider:  Connie Kwan    Chief Complaint     Chief Complaint   Patient presents with     Consult       History of Present Illness     Ramy Beasley is a 30 year old male who is seen in video visit for establishing care for DM type II management.  Has background history of seizure disorder from autonomic neuropathy metabolic encephalopathy allergic rhinitis sleep apnea vitamin D deficiency hyperlipidemia AVN nephrolithiasis history of pyelonephritis ALL anemia of chronic disease autistic disorder and mild ID with aggressive behavior.    Today his first visit with me.  Visit attended by his mother Mildred who is his guardian.  Ramy is nonverbal.    The patient was diagnosed with type 2 diabetes on 3/10/2025 with A1c of 10.3%.    Previous A1C in records reviewed.  Hemoglobin A1c of 10.3% on 3/10/2025.    Weight is 79 kg in November 2024.    Diabetes Care/Complications:   Nephropathy: No screening for albuminuria before, creatinine 0.65 on 2/5/2024.  Retinopathy:non verbal   HLD:  history of hypertriglyceridemia no fasting labs available, 9/26/2022 triglyceride level 610, 2/5/2024 triglyceride level 755, 3/10/2025 triglyceride level 1067 all these labs were all done on the afternoon.  LDL on 3/10/2025 was 68.  On omega-3 1200 mg daily. No hx of pancreatitis   Neuropathy: Virtual visit foot exam was not done.  HTN: No history of hypertension.      Previous DM related Hospitalization:  None.    Current treatment strategy:   He met with the diabetes educator on 3/14/2025 advised to start on metformin with up titration of the dose.  Currently on metformin 500 mg twice daily   Tolerance: well tolerated     History of malnutrition: Has history of  malnutrition was on tube feed in the past  History of pancreatitis: no  Personal/family history of MTC/MEN 2: no   History of urinary tract infection//urinary incontinence/fungal infection:hx of recurrent UTI he also has hx of urinary incontinence.     Blood Glucose Monitoring:     DATE TIME READING   4/14 1:30PM 380 (after lunch) then 240 an hour later   4/13 8:15AM 120   4/12 Fasting in the morning 140   4/11 Fasting in the morning 120   4/10 Fasting in the morning 120   4/9 Fasting in the morning 90   4/8 Fasting in the morning 125   4/7 Fasting in the morning 140   4/6 Fasting in the morning 160   4/5 Fasting in the morning 180   4/4 Fasting in the morningFasting in the morning 205   4/3 Fasting in the morning 160   4/2 Fasting in the morning 140   4/1 Fasting in the morning 140       Diet:   Breakfast:07:30 am  2 oz of cheese ,   Lunch: ultrathin crust pizza 4 slices   Dinner: no dinner   Snacks: protein bar fruits mixed nuts     Exercise: no         Family hx of DM: Mother: type 2 , maternal aunt: type 2       Pharmacy liaison consult on  4/15/2025:  Trulicity $25 per 30 days supply   Bydureon requires a prior auth     Zituvio $25 per 30 days   Saxagliptin $25 per 30 days   Tradjenta and Nesina non-formulary/prior auth required     Pioglitazone $10 per 30 days     Dexcom and Freestlye non-form/prior auth required on this plan     Problem List     Patient Active Problem List   Diagnosis     Allergic rhinitis     Constipation     Autistic Disorder     Seizure disorder (H)     B-cell acute lymphoblastic leukemia (ALL) (H)     Ureteral stone     At high risk for falls     Autonomic neuropathy     Avascular bone necrosis (H)     Developmental delay     Does not speak     Aggressive behavior     High risk medication use     Hyperlipemia     Incontinence     Insomnia     Leukemia (H)     Metabolic encephalopathy     Mild intellectual disability     Sleep apnea     Vitamin D deficiency     Anemia of chronic disease      Daytime sleepiness        Medications     Current Outpatient Medications   Medication Sig Dispense Refill     blood glucose (NO BRAND SPECIFIED) lancets standard Use to test blood sugar 2 times daily or as directed. 400 each 1     blood glucose (NO BRAND SPECIFIED) test strip Use to test blood sugar 2 times daily as directed. 200 strip 3     blood glucose (NO BRAND SPECIFIED) test strip Use to test blood sugar 1 times daily (ALTERNATE: before breakfast, before dinner and 2 hours after the largest meal). To accompany: Blood Glucose Monitor Brands: per insurance. 50 strip 6     blood glucose monitoring (NO BRAND SPECIFIED) meter device kit Use to test blood sugar 1 times daily. Preferred blood glucose meter OR supplies to accompany: Blood Glucose Monitor Brands: per insurance. 1 kit 0     cetirizine (ZYRTEC) 10 MG tablet Take 10 mg by mouth daily       cholecalciferol 50 MCG (2000 UT) CAPS Take 1 capsule by mouth daily       clonazePAM (KLONOPIN) 0.5 MG tablet Take 2.5 tablets by mouth every evening   5     clotrimazole (LOTRIMIN) 1 % external cream Apply topically 2 times daily (Patient taking differently: Apply topically 2 times daily) 15 g 0     diazepam (DIASTAT) 20 MG GEL rectal gel INSERT 20MG INTO THE RECTUM AS NEEDED FOR SEIZURES LASTING MorE THAN 5 MINUTES OR FOR CLUSTER SEIZURES Strength: 20 mg 1 each 1     fish oil-omega-3 fatty acids 1000 MG capsule Take 2 capsules (2 g) by mouth 2 times daily. 300 capsule 3     gabapentin (NEURONTIN) 300 MG capsule Take 600 mg by mouth at bedtime       lamoTRIgine (LAMICTAL) 100 MG tablet Take 5 tablets by mouth 2 times daily       levETIRAcetam (KEPPRA) 750 MG tablet Take 2 tablets by mouth 2 times daily       medical cannabis (Patient's own supply) every 24 hours       MELATONIN PO        metFORMIN (GLUCOPHAGE XR) 500 MG 24 hr tablet Take 2 tablets (1,000 mg) by mouth 2 times daily (with meals). 360 tablet 3     montelukast (SINGULAIR) 10 MG tablet Take 1 tablet (10  mg) by mouth at bedtime. 30 tablet 0     Multiple vitamin TABS Take 1 tablet by mouth daily       OXcarbazepine (TRILEPTAL) 150 MG tablet [OXCARBAZEPINE (TRILEPTAL) 150 MG TABLET] Take 1 tablet by mouth 2 (two) times a day.  12     OXcarbazepine (TRILEPTAL) 600 MG tablet [OXCARBAZEPINE (TRILEPTAL) 600 MG TABLET] Take 1 tablet by mouth 2 (two) times a day.  11     polyethylene glycol (MIRALAX) 17 gram packet [POLYETHYLENE GLYCOL (MIRALAX) 17 GRAM PACKET] Take 17 g by mouth daily.       QUEtiapine (SEROQUEL) 300 MG tablet Take 300 mg by mouth 2 times daily        risperiDONE (RISPERDAL) 3 MG tablet TAKE 1 TABLET BY MOUTH IN THE MORNING AND 1 & 1/2 (ONE & ONE-HALF) TABS AT BEDTIME 225 tablet 1     rosuvastatin (CRESTOR) 10 MG tablet Take 1 tablet (10 mg) by mouth daily. 90 tablet 3     SITagliptin (ZITUVIO) 50 MG TABS Take 50 mg by mouth daily. 90 tablet 3     thin (NO BRAND SPECIFIED) lancets Use with lancing device to check glucose 1 time daily per  direction. To accompany: Blood Glucose Monitor Brands: per insurance. 100 each 6     senna-docusate (SENOKOT-S/PERICOLACE) 8.6-50 MG tablet Take 1 tablet by mouth daily (Patient not taking: Reported on 4/15/2025)       sertraline (ZOLOFT) 100 MG tablet Take 1 tablet (100 mg) by mouth daily. (Patient not taking: Reported on 4/15/2025) 90 tablet 3     No current facility-administered medications for this visit.        Allergies     Allergies   Allergen Reactions     Pamidronate Hives, Itching and Rash     Pegaspargase Unknown       Medical / Surgical History     Past Medical History:   Diagnosis Date     Acute lymphoblastic leukemia (ALL) (H)      Acute Otitis Media     Created by Conversion Replacement Utility updated for latest IMO load      Allergic rhinitis     Created by [employer] [facility] Annotation: Oct  1 2009 12:46PM - Roosevelt Monsivais: RAST positive  to  trees, grasses, molds 11/2003 Replacement Utility updated for latest IMO load      Autistic Disorder       Diarrhea     Created by Conversion      Epilepsy And Recurrent Seizures      Otitis Externa     Created by Conversion Replacement Utility updated for latest IMO load      Ureteral stone 01/14/2022     Past Surgical History:   Procedure Laterality Date     HC REMOVAL ADENOIDS,SECOND,<13 Y/O      Description: Adenoidectomy Secondary;  Proc Date: 12/01/2000;     HC REMOVE TONSILS/ADENOIDS,<13 Y/O      Description: Tonsillectomy With Adenoidectomy;  Proc Date: 02/01/2000;     LASER HOLMIUM LITHOTRIPSY URETER(S), INSERT STENT, COMBINED Left 1/26/2022    Procedure: CYSTOURETEROSCOPY, WITH LITHOTRIPSY USING LASER AND URETERAL LEFT STENT INSERTION;  Surgeon: Nakul Casanova MD;  Location: [facility] OR       Social History     Social History     Socioeconomic History     Marital status: Single     Spouse name: Not on file     Number of children: 0     Years of education: 8     Highest education level: 8th grade   Occupational History     Occupation: Disabled   Tobacco Use     Smoking status: Never     Passive exposure: Never     Smokeless tobacco: Never   Vaping Use     Vaping status: Never Used   Substance and Sexual Activity     Alcohol use: Never     Drug use: Never     Sexual activity: Never   Other Topics Concern     Not on file   Social History Narrative     Not on file     Social Drivers of Health     Financial Resource Strain: Low Risk  (3/10/2025)    Financial Resource Strain      Within the past 12 months, have you or your family members you live with been unable to get utilities (heat, electricity) when it was really needed?: No   Food Insecurity: Low Risk  (3/10/2025)    Food Insecurity      Within the past 12 months, did you worry that your food would run out before you got money to buy more?: No      Within the past 12 months, did the food you bought just not last and you didn t have money to get more?: No   Transportation Needs: Low Risk  (3/10/2025)    Transportation Needs      Within the past 12 months,  has lack of transportation kept you from medical appointments, getting your medicines, non-medical meetings or appointments, work, or from getting things that you need?: No   Physical Activity: Unknown (3/10/2025)    Exercise Vital Sign      Days of Exercise per Week: 0 days      Minutes of Exercise per Session: Not on file   Stress: Patient Declined (3/10/2025)    [employer] [place] [employer] - Occupational Stress Questionnaire      Feeling of Stress : Patient declined   Social Connections: Unknown (3/10/2025)    Social Connection and Isolation Panel [NHANES]      Frequency of Communication with Friends and Family: Not on file      Frequency of Social Gatherings with Friends and Family: More than three times a week      Attends Anabaptism Services: Not on file      Active Member of Clubs or Organizations: Not on file      Attends Club or Organization Meetings: Not on file      Marital Status: Not on file   Interpersonal Safety: Unknown (11/5/2024)    Interpersonal Safety      Do you feel physically and emotionally safe where you currently live?: Patient unable to answer      Within the past 12 months, have you been hit, slapped, kicked or otherwise physically hurt by someone?: Patient unable to answer      Within the past 12 months, have you been humiliated or emotionally abused in other ways by your partner or ex-partner?: Patient unable to answer   Housing Stability: Low Risk  (3/10/2025)    Housing Stability      Do you have housing? : Yes      Are you worried about losing your housing?: No       Family History     Family History   Problem Relation Age of Onset     Diabetes Mother      No Known Problems Father      Bipolar Disorder Sister      Alcoholism Sister      Spina bifida Sister      Hydrocephalus Sister      Alzheimer Disease Maternal Grandmother      Heart Disease Maternal Grandfather      Emphysema Paternal Grandmother      No Known Problems Paternal Grandfather        ROS     12 ROS  "completed, pertinent positive and negative in HPI    Physical Exam   There were no vitals taken for this visit.   GENERAL: alert and no distress  EYES: Eyes grossly normal to inspection.  No discharge or erythema, or obvious scleral/conjunctival abnormalities.  RESP: No audible wheeze, cough, or visible cyanosis.    SKIN: Visible skin clear. No significant rash, abnormal pigmentation or lesions.     Labs/Imaging     Pertinent Labs were reviewed and discussed briefly.  Radiology Results were  reviewed.    Summary of recent findings:   Lab Results   Component Value Date    A1C 10.3 03/10/2025       TSH   Date Value Ref Range Status   02/05/2024 3.13 0.30 - 4.20 uIU/mL Final       Creatinine   Date Value Ref Range Status   02/05/2024 0.65 (L) 0.67 - 1.17 mg/dL Final       Recent Labs   Lab Test 03/10/25  1520 02/05/24  1644   CHOL 216* 195   HDL 27* 26*   LDL 68 74   TRIG 1,067* 755*       No results found for: \"[id number]\", \"[id number]\", \"[id number]\"    I personally reviewed the patient's outside records from [employer] EMR and Care Everywhere. Summary of pertinent findings in [facility] [place].    Impression / Plan     1.  Newly diagnosed diabetes hemoglobin A1c 10.3%:  Was found to have A1c of 10.3% in March 2025 prior to that no known history of diabetes or prediabetes.  His BMI 21.  Has family history of type 2 diabetes.  Was started on metformin recently currently taking IR form 500 mg twice daily well-tolerated no side effects.  He had BG readings for the last 2 weeks all fasting readings except 2 readings fasting readings above the goal.  He has history of autism he is nonverbal.  His mother Mildred is his main guardian she takes care of his medications.  She stated she would not be tolerating injectable medications.    He checks his BG fasting in the morning BG readings for the last 2 weeks were reviewed between  in the morning.    Plan:  - Will assess for the possibility of type I given his normal BMI by getting the " antibodies panel for type I.  And to get C-peptide and glucose checked.  -To increase metformin dose gradually over the course of the next 2 weeks up to maximum tolerated dose or total of 2000 mg daily.  -To start sitagliptin 50 mg daily.  -Strips and lancets prescribed for him today.  - Referral to ophthalmologist placed for diabetic eye exam.  - Screening for albuminuria needs to be done also order is placed for him.    2.  Hypertriglyceridemia:  Currently on omega-3 1200 mg daily.  No history of pancreatitis.    Plan:  -To start Crestor 10 mg daily.  - To change the dose of vitamin D3 to 2 g twice daily.  To get fasting lipid panel with the next lab test.        Test and/or medications prescribed today:  Orders Placed This Encounter   Procedures     Albumin Random Urine Quantitative with Creat Ratio     Hemoglobin A1c     Lipid panel reflex to direct LDL Fasting     Glutamic acid decarboxylase (NELY) antibody     C-peptide     Glucose     Islet Antigen (IA-2) Autoantibody, Serum     Zinc Transporter 8 Antibody     Insulin antibody     Adult Eye  Referral         Follow up: 3 months    Note: Chart documentation done in part with Dragon Voice Recognition software. Although reviewed after completion, some word and grammatical errors may remain.  Please consider this when interpreting information in this chart   ELIS Rowe  Endocrinology, Diabetes and Metabolism  [facility]     Again, thank you for allowing me to participate in the care of your patient.      Sincerely,    ELIS Rowe

## 2025-04-15 NOTE — NURSING NOTE
Current patient location: [address] [place] [id number]    Is the patient currently in the state of [place]? YES    Visit mode: VIDEO    If the visit is dropped, the patient can be reconnected by:VIDEO VISIT: Send to e-mail at: Long@"[email or web address]    Will anyone else be joining the visit? NO  (If patient encounters technical issues they should call [phone])    Are changes needed to the allergy or medication list? Yes pt has medications he is not taking     Are refills needed on medications prescribed by this physician? NO- new pt     Rooming Documentation:  Not applicable    Reason for visit: Consult    Luci LUDWIG

## 2025-04-17 DIAGNOSIS — J30.1 SEASONAL ALLERGIC RHINITIS DUE TO POLLEN: ICD-10-CM

## 2025-04-17 RX ORDER — MONTELUKAST SODIUM 10 MG/1
1 TABLET ORAL AT BEDTIME
Qty: 90 TABLET | Refills: 0 | Status: SHIPPED | OUTPATIENT
Start: 2025-04-17

## 2025-04-22 ENCOUNTER — LAB (OUTPATIENT)
Dept: LAB | Facility: CLINIC | Age: 30
End: 2025-04-22
Payer: COMMERCIAL

## 2025-04-22 DIAGNOSIS — E11.9 TYPE 2 DIABETES MELLITUS WITHOUT COMPLICATION, WITHOUT LONG-TERM CURRENT USE OF INSULIN (H): Primary | ICD-10-CM

## 2025-04-23 LAB
ANION GAP SERPL CALCULATED.3IONS-SCNC: 13 MMOL/L (ref 7–15)
BUN SERPL-MCNC: 18.2 MG/DL (ref 6–20)
C PEPTIDE SERPL-MCNC: 4.5 NG/ML (ref 0.9–6.9)
CALCIUM SERPL-MCNC: 10 MG/DL (ref 8.8–10.4)
CHLORIDE SERPL-SCNC: 102 MMOL/L (ref 98–107)
CHOLEST SERPL-MCNC: 180 MG/DL
CREAT SERPL-MCNC: 0.79 MG/DL (ref 0.67–1.17)
CREAT UR-MCNC: 132 MG/DL
EGFRCR SERPLBLD CKD-EPI 2021: >90 ML/MIN/1.73M2
FASTING STATUS PATIENT QL REPORTED: YES
GLUCOSE SERPL-MCNC: 174 MG/DL (ref 70–99)
GLUCOSE SERPL-MCNC: 174 MG/DL (ref 70–99)
HCO3 SERPL-SCNC: 25 MMOL/L (ref 22–29)
HDLC SERPL-MCNC: 24 MG/DL
LDLC SERPL CALC-MCNC: ABNORMAL MG/DL
LDLC SERPL DIRECT ASSAY-MCNC: 57 MG/DL
MICROALBUMIN UR-MCNC: 777 MG/L
MICROALBUMIN/CREAT UR: 588.64 MG/G CR (ref 0–17)
NONHDLC SERPL-MCNC: 156 MG/DL
POTASSIUM SERPL-SCNC: 4.5 MMOL/L (ref 3.4–5.3)
SODIUM SERPL-SCNC: 140 MMOL/L (ref 135–145)
TRIGL SERPL-MCNC: 772 MG/DL

## 2025-04-24 LAB
GAD65 AB SER IA-ACNC: <5 IU/ML
INSULIN AB SER IA-ACNC: <0.4 U/ML
ISLET CELL512 AB SER IA-ACNC: <5.4 U/ML

## 2025-04-27 LAB — ZNT8 AB SERPL IA-ACNC: 17.4 U/ML

## 2025-04-28 ENCOUNTER — TELEPHONE (OUTPATIENT)
Dept: FAMILY MEDICINE | Facility: CLINIC | Age: 30
End: 2025-04-28
Payer: COMMERCIAL

## 2025-04-28 NOTE — TELEPHONE ENCOUNTER
Patient Quality Outreach    Patient is due for the following:   Diabetic Eye exam.    Action(s) Taken:   Printed and Faxed Ophthalmology Referral to [facility] fax:[phone]       Left Msg  If Mom Returns  please let her know, If She does not receive a call from Ophthalmology [facility] within 2 business days, please call [phone]    Type of outreach:    Left Msg for Patient's Mom Milderd Mendoza faxed Referral to  [facility] Ophthalmology  to FX# [phone]    Questions for provider review:    None         Chica Morton, [facility]  Chart routed to None.

## 2025-05-13 ENCOUNTER — VIRTUAL VISIT (OUTPATIENT)
Dept: UROLOGY | Facility: CLINIC | Age: 30
End: 2025-05-13
Payer: COMMERCIAL

## 2025-05-13 DIAGNOSIS — N39.0 URINARY TRACT INFECTION DUE TO PROTEUS: Primary | ICD-10-CM

## 2025-05-13 DIAGNOSIS — B96.4 URINARY TRACT INFECTION DUE TO PROTEUS: Primary | ICD-10-CM

## 2025-05-13 NOTE — PROGRESS NOTES
Virtual Visit Details    Type of service:  Video Visit   Video Start Time: 8:28 AM  Video End Time:8:34 AM    Originating Location (pt. Location): Home          UROLOGY OUTPATIENT VISIT     ASSESSMENT/PLAN   30 year old year old being seen today for recently passed left ureteral stone  -Update renal US and KUB  -Suspect that he may be having struvite stone formation given the Proteus in his urine.  I recommend that we also update a cath urine sample.  If there is residual hydronephrosis or residual Proteus in the urine we may have to put our attention on his mechanics of bladder emptying which could be the underlying cause of his stones.    CHIEF COMPLAINT   Ureteral stone      Synopsis    Ramy Beasley is a very pleasant AGE: 30 year old year old person with complex PMH, including epilepsy, active autistic disorder, acute lymphoblastic leukemia in remission, mild intellectual disability, hyperlipidemia and autonomic neuropathy who has previously had kidney stones.  He underwent ureteroscopy with me last in 2022.  He was recently diagnosed with diabetes as well.    He was following up with me after recent emergency room visit 2 months ago where he was identified as having left hydronephrosis in the setting of left-sided renal colic.  He also had a urinary tract infection which I personally reviewed and was notable for Proteus greater than 100,000.  He was treated with antibiotics and is since feeling better.  A couple of small stones are seen in the lower poles of both kidneys.  I compared this to his prior CT scan from last year and the stones are new or at least larger than they were previously.    He is incontinent to a diaper.  He has not previously had the need to use catheters aside from urine collections.    He was previously taking Topamax.  He stopped this over 2 years ago.         Medications     Current Outpatient Medications   Medication Sig Dispense Refill    blood glucose (NO BRAND SPECIFIED) lancets  standard Use to test blood sugar 2 times daily or as directed. 400 each 1    blood glucose (NO BRAND SPECIFIED) test strip Use to test blood sugar 2 times daily as directed. 200 strip 3    blood glucose (NO BRAND SPECIFIED) test strip Use to test blood sugar 1 times daily (ALTERNATE: before breakfast, before dinner and 2 hours after the largest meal). To accompany: Blood Glucose Monitor Brands: per insurance. 50 strip 6    blood glucose monitoring (NO BRAND SPECIFIED) meter device kit Use to test blood sugar 1 times daily. Preferred blood glucose meter OR supplies to accompany: Blood Glucose Monitor Brands: per insurance. 1 kit 0    cetirizine (ZYRTEC) 10 MG tablet Take 10 mg by mouth daily      cholecalciferol 50 MCG (2000 UT) CAPS Take 1 capsule by mouth daily      clonazePAM (KLONOPIN) 0.5 MG tablet Take 2.5 tablets by mouth every evening   5    clotrimazole (LOTRIMIN) 1 % external cream Apply topically 2 times daily (Patient taking differently: Apply topically 2 times daily) 15 g 0    diazepam (DIASTAT) 20 MG GEL rectal gel INSERT 20MG INTO THE RECTUM AS NEEDED FOR SEIZURES LASTING MorE THAN 5 MINUTES OR FOR CLUSTER SEIZURES Strength: 20 mg 1 each 1    fish oil-omega-3 fatty acids 1000 MG capsule Take 2 capsules (2 g) by mouth 2 times daily. 300 capsule 3    gabapentin (NEURONTIN) 300 MG capsule Take 600 mg by mouth at bedtime      lamoTRIgine (LAMICTAL) 100 MG tablet Take 5 tablets by mouth 2 times daily      levETIRAcetam (KEPPRA) 750 MG tablet Take 2 tablets by mouth 2 times daily      medical cannabis (Patient's own supply) every 24 hours      MELATONIN PO       metFORMIN (GLUCOPHAGE XR) 500 MG 24 hr tablet Take 2 tablets (1,000 mg) by mouth 2 times daily (with meals). 360 tablet 3    montelukast (SINGULAIR) 10 MG tablet Take 1 tablet (10 mg) by mouth at bedtime. 90 tablet 0    Multiple vitamin TABS Take 1 tablet by mouth daily      OXcarbazepine (TRILEPTAL) 150 MG tablet [OXCARBAZEPINE (TRILEPTAL) 150 MG  TABLET] Take 1 tablet by mouth 2 (two) times a day.  12    OXcarbazepine (TRILEPTAL) 600 MG tablet [OXCARBAZEPINE (TRILEPTAL) 600 MG TABLET] Take 1 tablet by mouth 2 (two) times a day.  11    polyethylene glycol (MIRALAX) 17 gram packet [POLYETHYLENE GLYCOL (MIRALAX) 17 GRAM PACKET] Take 17 g by mouth daily.      QUEtiapine (SEROQUEL) 300 MG tablet Take 300 mg by mouth 2 times daily       risperiDONE (RISPERDAL) 3 MG tablet TAKE 1 TABLET BY MOUTH IN THE MORNING AND 1 & 1/2 (ONE & ONE-HALF) TABS AT BEDTIME 225 tablet 1    rosuvastatin (CRESTOR) 10 MG tablet Take 1 tablet (10 mg) by mouth daily. 90 tablet 3    senna-docusate (SENOKOT-S/PERICOLACE) 8.6-50 MG tablet Take 1 tablet by mouth daily (Patient not taking: Reported on 4/15/2025)      sertraline (ZOLOFT) 100 MG tablet Take 1 tablet (100 mg) by mouth daily. (Patient not taking: Reported on 4/15/2025) 90 tablet 3    SITagliptin (ZITUVIO) 50 MG TABS Take 50 mg by mouth daily. 90 tablet 3    thin (NO BRAND SPECIFIED) lancets Use with lancing device to check glucose 1 time daily per  direction. To accompany: Blood Glucose Monitor Brands: per insurance. 100 each 6     No current facility-administered medications for this visit.         The following  distinct labs were reviewed    I personally reviewed all applicable laboratory data and went over findings with patient  Significant for:    CBC RESULTS:  Recent Labs   Lab Test 02/05/24  1644 09/26/22  1206 02/28/22  1321   WBC 5.2 6.8 6.3   HGB 13.4 13.4 12.6*    232 250        BMP RESULTS:  Recent Labs   Lab Test 04/22/25  0858 02/05/24  1644 09/26/22  1206 02/28/22  1321    138 136 141   POTASSIUM 4.5 3.9 4.0 3.8   CHLORIDE 102 100 98 106   CO2 25 25 25 22   ANIONGAP 13 13 13 13   *  174* 200* 199* 157*   BUN 18.2 12.3 12.5 24*   CR 0.79 0.65* 0.78 0.87   GFRESTIMATED >90 >90 >90 >90       CALCIUM RESULTS:  Recent Labs   Lab Test 04/22/25  0858 02/05/24  1644 09/26/22  1203  "22  1321   GERARDO 10.0 9.8 9.3 9.2       PTH RESULTS:  No results for input(s): \"PTHI\" in the last 63059 hours.    HGB A1C RESULTS:  Lab Results   Component Value Date    A1C 10.3 03/10/2025       UA RESULTS:   Recent Labs   Lab Test 22  1328   SG 1.020   URINEPH 6.0   NITRITE Negative   RBCU 22*   WBCU 26*       PSA RESULTS  No results found for: \"PSA\"      Recent Imaging Report    I personally reviewed all applicable imaging and went over the below findings with patient.    Results for orders placed or performed during the hospital encounter of 24   Echocardiogram Limited     Value    LVEF  55-60%    Narrative    [id number]  [id number]  [id number]  [id number]^CODY^[place] [address]     Name: OLIVER STROUD  MRN: [id number]  : 1995  Study Date: 2024 02:04 PM  Age: 29 yrs  Gender: Male  [facility]  Reason For Study: Examination prior to chemotherapy  Ordering Physician: RUFINA ROSS  Referring Physician: RUFINA ROSS  Performed By: TAYA     BSA: 2.2 m2  Height: 75 in  Weight: 205 lb  ______________________________________________________________________________  Procedure  Limited Echo Adult. Technically difficult study. There is no comparison study  available. No hemodynamically significant valvular abnormalities on 2D or  color flow imaging.  ______________________________________________________________________________  Interpretation Summary     The left ventricle is normal in size.  The visual ejection fraction is 55-60%.  Regional wall motion is grossly normal but endocardial border definition is  limited  No hemodynamically significant valvular abnormalities on 2D or color flow  imaging.  There is no comparison study available.  ______________________________________________________________________________  Left Ventricle  The left ventricle is normal in size. There is normal left ventricular wall  thickness. The " visual ejection fraction is 55-60%. Regional wall motion is  grossly normal but endocardial border definition is limited.     Right Ventricle  The right ventricle is not well visualized.     Atria  The left atrium is not well visualized. Right atrium not well visualized.  Atrial septum not well seen.     Mitral Valve  Mitral valve leaflets appear normal. There is trace mitral regurgitation.  There is no mitral valve stenosis.     Tricuspid Valve  The tricuspid valve is not well visualized, but is grossly normal. There is  trace tricuspid regurgitation. Right ventricular systolic pressure could not  be approximated due to inadequate tricuspid regurgitation. There is no  tricuspid stenosis.     Aortic Valve  The aortic valve is trileaflet. No aortic regurgitation is present. No aortic  stenosis is present.     Pulmonic Valve  The pulmonic valve is not well visualized. There is no pulmonic valvular  regurgitation. There is no pulmonic valvular stenosis.     Vessels  The aorta root is normal. The thoracic aorta cannot be assessed. Inferior vena  cava not well visualized for estimation of right atrial pressure.     Pericardium  There is no pericardial effusion.     Rhythm  Sinus rhythm was noted.  ______________________________________________________________________________  MMode/2D Measurements & Calculations  IVSd: 0.78 cm  LVIDd: 3.6 cm  LVIDs: 2.5 cm  LVPWd: 0.89 cm  FS: 31.8 %  LV mass(C)d: 84.5 grams  LV mass(C)dI: 38.1 grams/m2  Ao root diam: 3.6 cm  LVOT diam: 2.0 cm  LVOT area: 3.1 cm2  Ao root diam index Ht(cm/m): 1.9  Ao root diam index BSA (cm/m2): 1.6  EF Biplane: 61.3 %  RWT: 0.49     Time Measurements  MM HR: 112.0 BPM     Doppler Measurements & Calculations  Ao V2 max: 111.9 cm/sec  Ao max P.0 mmHg  Ao V2 mean: 82.2 cm/sec  Ao mean P.9 mmHg  Ao V2 VTI: 14.8 cm  PA acc time: 0.13 sec     ______________________________________________________________________________  Report approved by: Marion J  Rula 11/04/2024 03:11 PM             CC:  Clinic - [facility]    Distant Location (provider location):  On-site  Platform used for Video Visit: Mandie

## 2025-05-13 NOTE — LETTER
5/13/2025       RE: Ramy Beasley  [address] [address]     Dear Colleague,    Thank you for referring your patient, Ramy Beasley, to the [facility] [place] at [facility]. Please see a copy of my visit note below.    Virtual Visit Details    Type of service:  Video Visit   Video Start Time: 8:28 AM  Video End Time:8:34 AM    Originating Location (pt. Location): Home          UROLOGY OUTPATIENT VISIT     ASSESSMENT/PLAN   30 year old year old being seen today for recently passed left ureteral stone  -Update renal US and KUB  -Suspect that he may be having struvite stone formation given the Proteus in his urine.  I recommend that we also update a cath urine sample.  If there is residual hydronephrosis or residual Proteus in the urine we may have to put our attention on his mechanics of bladder emptying which could be the underlying cause of his stones.    CHIEF COMPLAINT   Ureteral stone      Synopsis    Ramy Beasley is a very pleasant AGE: 30 year old year old person with complex PMH, including epilepsy, active autistic disorder, acute lymphoblastic leukemia in remission, mild intellectual disability, hyperlipidemia and autonomic neuropathy who has previously had kidney stones.  He underwent ureteroscopy with me last in 2022.  He was recently diagnosed with diabetes as well.    He was following up with me after recent emergency room visit 2 months ago where he was identified as having left hydronephrosis in the setting of left-sided renal colic.  He also had a urinary tract infection which I personally reviewed and was notable for Proteus greater than 100,000.  He was treated with antibiotics and is since feeling better.  A couple of small stones are seen in the lower poles of both kidneys.  I compared this to his prior CT scan from last year and the stones are new or at least larger than they were previously.    He is  incontinent to a diaper.  He has not previously had the need to use catheters aside from urine collections.    He was previously taking Topamax.  He stopped this over 2 years ago.         Medications     Current Outpatient Medications   Medication Sig Dispense Refill     blood glucose (NO BRAND SPECIFIED) lancets standard Use to test blood sugar 2 times daily or as directed. 400 each 1     blood glucose (NO BRAND SPECIFIED) test strip Use to test blood sugar 2 times daily as directed. 200 strip 3     blood glucose (NO BRAND SPECIFIED) test strip Use to test blood sugar 1 times daily (ALTERNATE: before breakfast, before dinner and 2 hours after the largest meal). To accompany: Blood Glucose Monitor Brands: per insurance. 50 strip 6     blood glucose monitoring (NO BRAND SPECIFIED) meter device kit Use to test blood sugar 1 times daily. Preferred blood glucose meter OR supplies to accompany: Blood Glucose Monitor Brands: per insurance. 1 kit 0     cetirizine (ZYRTEC) 10 MG tablet Take 10 mg by mouth daily       cholecalciferol 50 MCG (2000 UT) CAPS Take 1 capsule by mouth daily       clonazePAM (KLONOPIN) 0.5 MG tablet Take 2.5 tablets by mouth every evening   5     clotrimazole (LOTRIMIN) 1 % external cream Apply topically 2 times daily (Patient taking differently: Apply topically 2 times daily) 15 g 0     diazepam (DIASTAT) 20 MG GEL rectal gel INSERT 20MG INTO THE RECTUM AS NEEDED FOR SEIZURES LASTING MorE THAN 5 MINUTES OR FOR CLUSTER SEIZURES Strength: 20 mg 1 each 1     fish oil-omega-3 fatty acids 1000 MG capsule Take 2 capsules (2 g) by mouth 2 times daily. 300 capsule 3     gabapentin (NEURONTIN) 300 MG capsule Take 600 mg by mouth at bedtime       lamoTRIgine (LAMICTAL) 100 MG tablet Take 5 tablets by mouth 2 times daily       levETIRAcetam (KEPPRA) 750 MG tablet Take 2 tablets by mouth 2 times daily       medical cannabis (Patient's own supply) every 24 hours       MELATONIN PO        metFORMIN (GLUCOPHAGE  XR) 500 MG 24 hr tablet Take 2 tablets (1,000 mg) by mouth 2 times daily (with meals). 360 tablet 3     montelukast (SINGULAIR) 10 MG tablet Take 1 tablet (10 mg) by mouth at bedtime. 90 tablet 0     Multiple vitamin TABS Take 1 tablet by mouth daily       OXcarbazepine (TRILEPTAL) 150 MG tablet [OXCARBAZEPINE (TRILEPTAL) 150 MG TABLET] Take 1 tablet by mouth 2 (two) times a day.  12     OXcarbazepine (TRILEPTAL) 600 MG tablet [OXCARBAZEPINE (TRILEPTAL) 600 MG TABLET] Take 1 tablet by mouth 2 (two) times a day.  11     polyethylene glycol (MIRALAX) 17 gram packet [POLYETHYLENE GLYCOL (MIRALAX) 17 GRAM PACKET] Take 17 g by mouth daily.       QUEtiapine (SEROQUEL) 300 MG tablet Take 300 mg by mouth 2 times daily        risperiDONE (RISPERDAL) 3 MG tablet TAKE 1 TABLET BY MOUTH IN THE MORNING AND 1 & 1/2 (ONE & ONE-HALF) TABS AT BEDTIME 225 tablet 1     rosuvastatin (CRESTOR) 10 MG tablet Take 1 tablet (10 mg) by mouth daily. 90 tablet 3     senna-docusate (SENOKOT-S/PERICOLACE) 8.6-50 MG tablet Take 1 tablet by mouth daily (Patient not taking: Reported on 4/15/2025)       sertraline (ZOLOFT) 100 MG tablet Take 1 tablet (100 mg) by mouth daily. (Patient not taking: Reported on 4/15/2025) 90 tablet 3     SITagliptin (ZITUVIO) 50 MG TABS Take 50 mg by mouth daily. 90 tablet 3     thin (NO BRAND SPECIFIED) lancets Use with lancing device to check glucose 1 time daily per  direction. To accompany: Blood Glucose Monitor Brands: per insurance. 100 each 6     No current facility-administered medications for this visit.         The following  distinct labs were reviewed    I personally reviewed all applicable laboratory data and went over findings with patient  Significant for:    CBC RESULTS:  Recent Labs   Lab Test 02/05/24  1644 09/26/22  1206 02/28/22  1321   WBC 5.2 6.8 6.3   HGB 13.4 13.4 12.6*    232 250        BMP RESULTS:  Recent Labs   Lab Test 04/22/25  0858 02/05/24  1644 09/26/22  1209  "22  1321    138 136 141   POTASSIUM 4.5 3.9 4.0 3.8   CHLORIDE 102 100 98 106   CO2 25 25 25 22   ANIONGAP 13 13 13 13   *  174* 200* 199* 157*   BUN 18.2 12.3 12.5 24*   CR 0.79 0.65* 0.78 0.87   GFRESTIMATED >90 >90 >90 >90       CALCIUM RESULTS:  Recent Labs   Lab Test 25  0858 24  1644 22  1206 22  1321   GERARDO 10.0 9.8 9.3 9.2       PTH RESULTS:  No results for input(s): \"PTHI\" in the last 26295 hours.    HGB A1C RESULTS:  Lab Results   Component Value Date    A1C 10.3 03/10/2025       UA RESULTS:   Recent Labs   Lab Test 22  1328   SG 1.020   URINEPH 6.0   NITRITE Negative   RBCU 22*   WBCU 26*       PSA RESULTS  No results found for: \"PSA\"      Recent Imaging Report    I personally reviewed all applicable imaging and went over the below findings with patient.    Results for orders placed or performed during the hospital encounter of 24   Echocardiogram Limited     Value    LVEF  55-60%    Narrative    [id number]  [id number]  [id number]  [id number]^CODY^[place] [address]     Name: OLIVER STROUD  MRN: [id number]  : 1995  Study Date: 2024 02:04 PM  Age: 29 yrs  Gender: Male  Patient Location: [facility]  Reason For Study: Examination prior to chemotherapy  Ordering Physician: RUFINA ROSS  Referring Physician: RUFINA ROSS  Performed By: TAYA     BSA: 2.2 m2  Height: 75 in  Weight: 205 lb  ______________________________________________________________________________  Procedure  Limited Echo Adult. Technically difficult study. There is no comparison study  available. No hemodynamically significant valvular abnormalities on 2D or  color flow imaging.  ______________________________________________________________________________  Interpretation Summary     The left ventricle is normal in size.  The visual ejection fraction is 55-60%.  Regional wall motion is grossly normal but endocardial border " definition is  limited  No hemodynamically significant valvular abnormalities on 2D or color flow  imaging.  There is no comparison study available.  ______________________________________________________________________________  Left Ventricle  The left ventricle is normal in size. There is normal left ventricular wall  thickness. The visual ejection fraction is 55-60%. Regional wall motion is  grossly normal but endocardial border definition is limited.     Right Ventricle  The right ventricle is not well visualized.     Atria  The left atrium is not well visualized. Right atrium not well visualized.  Atrial septum not well seen.     Mitral Valve  Mitral valve leaflets appear normal. There is trace mitral regurgitation.  There is no mitral valve stenosis.     Tricuspid Valve  The tricuspid valve is not well visualized, but is grossly normal. There is  trace tricuspid regurgitation. Right ventricular systolic pressure could not  be approximated due to inadequate tricuspid regurgitation. There is no  tricuspid stenosis.     Aortic Valve  The aortic valve is trileaflet. No aortic regurgitation is present. No aortic  stenosis is present.     Pulmonic Valve  The pulmonic valve is not well visualized. There is no pulmonic valvular  regurgitation. There is no pulmonic valvular stenosis.     Vessels  The aorta root is normal. The thoracic aorta cannot be assessed. Inferior vena  cava not well visualized for estimation of right atrial pressure.     Pericardium  There is no pericardial effusion.     Rhythm  Sinus rhythm was noted.  ______________________________________________________________________________  MMode/2D Measurements & Calculations  IVSd: 0.78 cm  LVIDd: 3.6 cm  LVIDs: 2.5 cm  LVPWd: 0.89 cm  FS: 31.8 %  LV mass(C)d: 84.5 grams  LV mass(C)dI: 38.1 grams/m2  Ao root diam: 3.6 cm  LVOT diam: 2.0 cm  LVOT area: 3.1 cm2  Ao root diam index Ht(cm/m): 1.9  Ao root diam index BSA (cm/m2): 1.6  EF Biplane: 61.3  %  RWT: 0.49     Time Measurements  MM HR: 112.0 BPM     Doppler Measurements & Calculations  Ao V2 max: 111.9 cm/sec  Ao max P.0 mmHg  Ao V2 mean: 82.2 cm/sec  Ao mean P.9 mmHg  Ao V2 VTI: 14.8 cm  PA acc time: 0.13 sec     ______________________________________________________________________________  Report approved by: Marion Horta 2024 03:11 PM             CC:  [facility]    Distant Location (provider location):  On-site  Platform used for Video Visit: Mandie      Again, thank you for allowing me to participate in the care of your patient.      Sincerely,    Nakul Casanova MD

## 2025-05-13 NOTE — NURSING NOTE
Current patient location: [place]    Is the patient currently in the state of [place]? YES    Visit mode: VIDEO    If the visit is dropped, the patient can be reconnected by:VIDEO VISIT: Text to cell phone:   Telephone Information:   Mobile [phone]       Will anyone else be joining the visit? MomMildred, is on call  (If patient encounters technical issues they should call [phone] :[phone])    Are changes needed to the allergy or medication list? No    Are refills needed on medications prescribed by this physician? Discuss with provider    Rooming Documentation:  Questionnaire(s) completed    Reason for visit: RECHECK    Fiona LUDWIG

## 2025-05-19 ENCOUNTER — TELEPHONE (OUTPATIENT)
Dept: FAMILY MEDICINE | Facility: CLINIC | Age: 30
End: 2025-05-19
Payer: COMMERCIAL

## 2025-05-19 NOTE — TELEPHONE ENCOUNTER
Reason for Call:  Appointment Request    Patient requesting this type of appt:  office visit     Requested provider: Dr. Kwan     Reason patient unable to be scheduled: Not with their preferred provider    When does patient want to be seen/preferred time: 3-7 days    Comments: Father called in and is requesting Dr. Kwan for an office visit - pt is autistic and non verbal - father said his son has been very tired lately and is not feeling normal. They would like to see this provider as pt is very comfortable with .     Okay to leave a detailed message?: Yes at Cell number on file:    Telephone Information:   Mobile [phone]       Call taken on 5/19/2025 at 4:33 PM by Janine Chakraborty

## 2025-05-21 ENCOUNTER — OFFICE VISIT (OUTPATIENT)
Dept: FAMILY MEDICINE | Facility: CLINIC | Age: 30
End: 2025-05-21
Payer: COMMERCIAL

## 2025-05-21 VITALS
DIASTOLIC BLOOD PRESSURE: 72 MMHG | SYSTOLIC BLOOD PRESSURE: 112 MMHG | TEMPERATURE: 98.5 F | BODY MASS INDEX: 21.19 KG/M2 | OXYGEN SATURATION: 98 % | HEART RATE: 91 BPM | HEIGHT: 76 IN | WEIGHT: 174 LBS

## 2025-05-21 DIAGNOSIS — R53.83 LOW ENERGY: Primary | ICD-10-CM

## 2025-05-21 DIAGNOSIS — R56.9 SEIZURES (H): ICD-10-CM

## 2025-05-21 DIAGNOSIS — R53.83 OTHER FATIGUE: ICD-10-CM

## 2025-05-21 LAB
BASOPHILS # BLD AUTO: 0 10E3/UL (ref 0–0.2)
BASOPHILS NFR BLD AUTO: 0 %
EOSINOPHIL # BLD AUTO: 0.3 10E3/UL (ref 0–0.7)
EOSINOPHIL NFR BLD AUTO: 4 %
ERYTHROCYTE [DISTWIDTH] IN BLOOD BY AUTOMATED COUNT: 12.8 % (ref 10–15)
HCT VFR BLD AUTO: 41.5 % (ref 40–53)
HGB BLD-MCNC: 14.2 G/DL (ref 13.3–17.7)
IMM GRANULOCYTES # BLD: 0 10E3/UL
IMM GRANULOCYTES NFR BLD: 0 %
LYMPHOCYTES # BLD AUTO: 1.9 10E3/UL (ref 0.8–5.3)
LYMPHOCYTES NFR BLD AUTO: 30 %
MCH RBC QN AUTO: 29.8 PG (ref 26.5–33)
MCHC RBC AUTO-ENTMCNC: 34.2 G/DL (ref 31.5–36.5)
MCV RBC AUTO: 87 FL (ref 78–100)
MONOCYTES # BLD AUTO: 0.5 10E3/UL (ref 0–1.3)
MONOCYTES NFR BLD AUTO: 9 %
NEUTROPHILS # BLD AUTO: 3.4 10E3/UL (ref 1.6–8.3)
NEUTROPHILS NFR BLD AUTO: 56 %
PLATELET # BLD AUTO: 252 10E3/UL (ref 150–450)
RBC # BLD AUTO: 4.77 10E6/UL (ref 4.4–5.9)
WBC # BLD AUTO: 6.1 10E3/UL (ref 4–11)

## 2025-05-21 PROCEDURE — G2211 COMPLEX E/M VISIT ADD ON: HCPCS | Performed by: STUDENT IN AN ORGANIZED HEALTH CARE EDUCATION/TRAINING PROGRAM

## 2025-05-21 PROCEDURE — 84443 ASSAY THYROID STIM HORMONE: CPT | Performed by: STUDENT IN AN ORGANIZED HEALTH CARE EDUCATION/TRAINING PROGRAM

## 2025-05-21 PROCEDURE — 80053 COMPREHEN METABOLIC PANEL: CPT | Performed by: STUDENT IN AN ORGANIZED HEALTH CARE EDUCATION/TRAINING PROGRAM

## 2025-05-21 PROCEDURE — 36415 COLL VENOUS BLD VENIPUNCTURE: CPT | Performed by: STUDENT IN AN ORGANIZED HEALTH CARE EDUCATION/TRAINING PROGRAM

## 2025-05-21 PROCEDURE — 82306 VITAMIN D 25 HYDROXY: CPT | Performed by: STUDENT IN AN ORGANIZED HEALTH CARE EDUCATION/TRAINING PROGRAM

## 2025-05-21 PROCEDURE — 85025 COMPLETE CBC W/AUTO DIFF WBC: CPT | Performed by: STUDENT IN AN ORGANIZED HEALTH CARE EDUCATION/TRAINING PROGRAM

## 2025-05-21 PROCEDURE — 3078F DIAST BP <80 MM HG: CPT | Performed by: STUDENT IN AN ORGANIZED HEALTH CARE EDUCATION/TRAINING PROGRAM

## 2025-05-21 PROCEDURE — 99213 OFFICE O/P EST LOW 20 MIN: CPT | Performed by: STUDENT IN AN ORGANIZED HEALTH CARE EDUCATION/TRAINING PROGRAM

## 2025-05-21 PROCEDURE — 3074F SYST BP LT 130 MM HG: CPT | Performed by: STUDENT IN AN ORGANIZED HEALTH CARE EDUCATION/TRAINING PROGRAM

## 2025-05-21 NOTE — PROGRESS NOTES
Assessment & Plan     Low energy  Other fatigue  - Low energy and fatigue possibly due to a viral infection, high blood sugar levels, or recent seizures. Afebrile, and no chills, or respiratory symptoms present. Dad reports that Ramy's blood sugar levels have been in range but unsure how frequently mom is measuring them.   - Monitor blood sugar levels twice daily, fasting in the morning and 1-2 hours postprandial. Aim for fasting levels less than 120 and postprandial levels less than 200. Reach out on myChart with blood sugar values. Perform a complete blood count, CRP, CMP, and thyroid function tests. Check vitamin D levels. Collect urine sample for analysis. Follow up with neurology if another seizure occurs.    Subjective   Ramy is a 30 year old, presenting for the following health issues:  Low energy         5/21/2025     1:30 PM   Additional Questions   Roomed by Sharona Beasley, 30 years    Low energy and fatigue  - Reports of low energy and increased sleep over the past few days  - Typically wakes up at 5:00 or 6:00 AM, but recently slept until 9:00 AM  - No fever or other symptoms reported  - Appears tired and not his usual self    Seizures  - Experienced a full seizure on Saturday, May 17, 2025, and another on Monday, May 19, 2025  - Full seizures occurring close together is unusual for him  - Typically has drop seizures more regularly  - Seizures usually result in significant fatigue, with prolonged sleep following the event    Diabetes management  - On a new diet for diabetes, which he is tolerating well  - Blood sugar levels have been within range  - Last A1c test was conducted on March 10, 2025  - Mother is managing his diabetes care and monitoring blood sugar levels    Urinary and bowel habits  - No changes in urination or bowel movements reported  - No discomfort during urination  - Urologist is planning a CT scan due to possible kidney stones    Pain threshold  - High pain  "threshold noted  - No recent incidents of pain or discomfort reported    Past COVID-19 infection  - Previous COVID-19 infection noted, during which he was more fatigued than currently    Dietary habits  - Adjusted to new dietary restrictions due to diabetes  - Consumes more protein bars, cashews, and thin-crust pizza  - Occasionally eats grapes and other balanced foods    Communication and behavior  - Unable to communicate pain verbally, but does not exhibit signs of pain currently  - Generally patient and well-behaved    Recent medical consultations  - Recent consultation with neurology, medication unchanged  - Consulted with a urologist regarding kidney stones    Family involvement  - Mother actively involved in his care and diabetes management        Objective    /72   Pulse 91   Temp 98.5  F (36.9  C) (Temporal)   Ht 1.92 m (6' 3.6\")   Wt 78.9 kg (174 lb)   SpO2 98%   BMI 21.40 kg/m    Body mass index is 21.4 kg/m .  Physical Exam   GENERAL: alert and no distress, but appearing quite run down and lacking his usual lustre and energy  EYES: Eyes grossly normal to inspection, PERRL and conjunctivae and sclerae normal  HENT: ear canals and TM's normal, nose and mouth without ulcers or lesions  NECK: no adenopathy, no asymmetry, masses, or scars  RESP: lungs clear to auscultation - no rales, rhonchi or wheezes  CV: regular rate and rhythm, normal S1 S2, no S3 or S4, no murmur, click or rub, no peripheral edema  ABDOMEN: soft, nontender, no hepatosplenomegaly, no masses and bowel sounds normal  MS: no gross musculoskeletal defects noted, no edema  SKIN: no suspicious lesions or rashes      Signed Electronically by: Connie Kwan MD    "

## 2025-05-22 LAB
ALBUMIN SERPL BCG-MCNC: 4.8 G/DL (ref 3.5–5.2)
ALP SERPL-CCNC: 102 U/L (ref 40–150)
ALT SERPL W P-5'-P-CCNC: 27 U/L (ref 0–70)
ANION GAP SERPL CALCULATED.3IONS-SCNC: 14 MMOL/L (ref 7–15)
AST SERPL W P-5'-P-CCNC: 28 U/L (ref 0–45)
BILIRUB SERPL-MCNC: 0.2 MG/DL
BUN SERPL-MCNC: 15.7 MG/DL (ref 6–20)
CALCIUM SERPL-MCNC: 9.8 MG/DL (ref 8.8–10.4)
CHLORIDE SERPL-SCNC: 99 MMOL/L (ref 98–107)
CREAT SERPL-MCNC: 0.65 MG/DL (ref 0.67–1.17)
EGFRCR SERPLBLD CKD-EPI 2021: >90 ML/MIN/1.73M2
GLUCOSE SERPL-MCNC: 108 MG/DL (ref 70–99)
HCO3 SERPL-SCNC: 23 MMOL/L (ref 22–29)
POTASSIUM SERPL-SCNC: 4.4 MMOL/L (ref 3.4–5.3)
PROT SERPL-MCNC: 7.8 G/DL (ref 6.4–8.3)
SODIUM SERPL-SCNC: 136 MMOL/L (ref 135–145)
TSH SERPL DL<=0.005 MIU/L-ACNC: 2.43 UIU/ML (ref 0.3–4.2)
VIT D+METAB SERPL-MCNC: 45 NG/ML (ref 20–50)

## 2025-06-30 ENCOUNTER — TELEPHONE (OUTPATIENT)
Dept: EDUCATION SERVICES | Facility: CLINIC | Age: 30
End: 2025-06-30
Payer: COMMERCIAL

## 2025-06-30 DIAGNOSIS — E11.9 TYPE 2 DIABETES MELLITUS WITHOUT COMPLICATION, WITHOUT LONG-TERM CURRENT USE OF INSULIN (H): Primary | ICD-10-CM

## 2025-06-30 NOTE — TELEPHONE ENCOUNTER
Tacho Monson,    Ramy's mom, Mildred, is hoping to get Ramy's fasting blood glucose <130 mg/dL and currently only seeing about 30% of fasting blood glucose in range of  mg/dL. She wanted to know if it would OK to try Mounjaro- feels like Ramy may do better with needles than originally thought. Told her if Mounjaro is started, would stop sitagliptin.    I saw that with his T1 panel check, the Zinc transporter antibody was present but none of the others. I haven't seen that before so as not sure if you had a different medication plan you'd recommend.  In the meantime, I had also suggested they could try a snack at night over the weekend to see if that helps him. If you are agreeable to trying Mounjaro, can you please place the prescription for 2.5 mg weekly Mounjaro and I'd be happy to reach out to Ramy's mom?    They follow up with you in August.    Thanks,  Juanita Frye,  LEGIH, LD, CDCES

## 2025-07-08 NOTE — TELEPHONE ENCOUNTER
Was able to connect with Mildred by phone to let her know that 2.5 mg weekly Mounjaro was approved and prescription sent to [facility] to be filled. Encouraged her to reach out if there are any issues picking up the prescription. Did also review that she is to stop the Sitagliptin (Zituvio) the day Mounjaro is started. Mildred verbalized understanding of concepts discussed and recommendations provided and was able to repeat back instructions correctly.    Discontinued Sitaglipitin from Ramy's medication list.    Juanita Frye RDN, LD, CDCES

## 2025-07-08 NOTE — TELEPHONE ENCOUNTER
Order signed to stop sitagliptin and to start Mounjaro 2.5 mg weekly.  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   Statement Selected

## 2025-07-16 DIAGNOSIS — J30.1 SEASONAL ALLERGIC RHINITIS DUE TO POLLEN: ICD-10-CM

## 2025-07-16 RX ORDER — MONTELUKAST SODIUM 10 MG/1
1 TABLET ORAL AT BEDTIME
Qty: 90 TABLET | Refills: 0 | Status: SHIPPED | OUTPATIENT
Start: 2025-07-16

## 2025-07-16 NOTE — TELEPHONE ENCOUNTER
Pending Prescriptions:                       Disp   Refills    montelukast (SINGULAIR) 10 MG tablet      90 tab*0            Sig: Take 1 tablet (10 mg) by mouth at bedtime.    Pharmacy:    [facility] [address] [place] [address] [phone] [facility]

## 2025-08-05 ENCOUNTER — LAB (OUTPATIENT)
Dept: LAB | Facility: CLINIC | Age: 30
End: 2025-08-05
Payer: COMMERCIAL

## 2025-08-05 DIAGNOSIS — E11.9 TYPE 2 DIABETES MELLITUS WITHOUT COMPLICATION, WITHOUT LONG-TERM CURRENT USE OF INSULIN (H): ICD-10-CM

## 2025-08-05 LAB
EST. AVERAGE GLUCOSE BLD GHB EST-MCNC: 114 MG/DL
HBA1C MFR BLD: 5.6 % (ref 0–5.6)

## 2025-08-05 PROCEDURE — 83036 HEMOGLOBIN GLYCOSYLATED A1C: CPT

## 2025-08-05 PROCEDURE — 36415 COLL VENOUS BLD VENIPUNCTURE: CPT

## 2025-08-12 ENCOUNTER — VIRTUAL VISIT (OUTPATIENT)
Dept: ENDOCRINOLOGY | Facility: CLINIC | Age: 30
End: 2025-08-12
Payer: COMMERCIAL

## 2025-08-12 DIAGNOSIS — E10.9 TYPE 1 DIABETES MELLITUS WITHOUT COMPLICATION (H): Primary | ICD-10-CM

## 2025-08-12 DIAGNOSIS — E78.1 HYPERTRIGLYCERIDEMIA: ICD-10-CM

## 2025-08-12 RX ORDER — KETOROLAC TROMETHAMINE 30 MG/ML
1 INJECTION, SOLUTION INTRAMUSCULAR; INTRAVENOUS ONCE
Qty: 1 EACH | Refills: 0 | Status: SHIPPED | OUTPATIENT
Start: 2025-08-12 | End: 2025-08-12

## 2025-08-12 RX ORDER — ROSUVASTATIN CALCIUM 10 MG/1
10 TABLET, COATED ORAL DAILY
Qty: 90 TABLET | Refills: 3 | Status: SHIPPED | OUTPATIENT
Start: 2025-08-12

## 2025-08-12 RX ORDER — HYDROCHLOROTHIAZIDE 12.5 MG/1
1 CAPSULE ORAL SEE ADMIN INSTRUCTIONS
Qty: 6 EACH | Refills: 3 | Status: SHIPPED | OUTPATIENT
Start: 2025-08-12

## 2025-08-12 RX ORDER — INSULIN ASPART 100 [IU]/ML
INJECTION, SOLUTION INTRAVENOUS; SUBCUTANEOUS
Qty: 15 ML | Refills: 3 | Status: SHIPPED | OUTPATIENT
Start: 2025-08-12

## 2025-08-12 RX ORDER — METFORMIN HYDROCHLORIDE 500 MG/1
1000 TABLET, EXTENDED RELEASE ORAL 2 TIMES DAILY WITH MEALS
Qty: 360 TABLET | Refills: 3 | Status: SHIPPED | OUTPATIENT
Start: 2025-08-12

## 2025-08-12 ASSESSMENT — PAIN SCALES - GENERAL: PAINLEVEL_OUTOF10: NO PAIN (0)

## 2025-08-13 ENCOUNTER — PATIENT OUTREACH (OUTPATIENT)
Dept: CARE COORDINATION | Facility: CLINIC | Age: 30
End: 2025-08-13
Payer: COMMERCIAL

## 2025-08-18 ENCOUNTER — TELEPHONE (OUTPATIENT)
Dept: ENDOCRINOLOGY | Facility: CLINIC | Age: 30
End: 2025-08-18
Payer: COMMERCIAL

## 2025-09-02 ENCOUNTER — ALLIED HEALTH/NURSE VISIT (OUTPATIENT)
Dept: EDUCATION SERVICES | Facility: CLINIC | Age: 30
End: 2025-09-02
Attending: STUDENT IN AN ORGANIZED HEALTH CARE EDUCATION/TRAINING PROGRAM
Payer: COMMERCIAL

## 2025-09-02 DIAGNOSIS — E10.9 TYPE 1 DIABETES MELLITUS WITHOUT COMPLICATION (H): ICD-10-CM

## 2025-09-02 PROCEDURE — G0108 DIAB MANAGE TRN  PER INDIV: HCPCS | Performed by: REGISTERED NURSE

## 2025-09-02 RX ORDER — GLUCAGON 3 MG/1
POWDER NASAL
Qty: 1 EACH | Refills: 3 | Status: SHIPPED | OUTPATIENT
Start: 2025-09-02

## (undated) DEVICE — GUIDEWIRE SENSOR DUAL FLEX STR 0.035"X150CM M0066703080

## (undated) DEVICE — SPECIMEN CONTAINER 5OZ STERILE 2600SA

## (undated) DEVICE — SOL NACL 0.9% IRRIG 3000ML BAG 2B7477

## (undated) DEVICE — RAD RX CONRAY 60% (50ML) CHARGE PER ML

## (undated) DEVICE — PACK CYSTO CUSTOM ASC

## (undated) DEVICE — LINEN TOWEL PACK X5 5464

## (undated) DEVICE — BASKET NITINOL TIPLESS HALO  1.5FRX120CM 554120

## (undated) DEVICE — GLOVE PROTEXIS W/NEU-THERA 7.5  2D73TE75

## (undated) DEVICE — CATH URETERAL OPEN END 5FRX70CM M0064002010

## (undated) DEVICE — TUBING SET THERMEDX UROLOGY SGL USE LL0006

## (undated) DEVICE — PAD CHUX UNDERPAD 30X30"

## (undated) DEVICE — KIT ENDO FIRST STEP DISINFECTANT 200ML W/POUCH EP-4

## (undated) DEVICE — DRAPE C-ARM W/STRAPS 42X72" 07-CA104

## (undated) RX ORDER — PROPOFOL 10 MG/ML
INJECTION, EMULSION INTRAVENOUS
Status: DISPENSED
Start: 2022-01-26

## (undated) RX ORDER — CEFTRIAXONE SODIUM 1 G
VIAL (EA) INJECTION
Status: DISPENSED
Start: 2022-01-26

## (undated) RX ORDER — KETOROLAC TROMETHAMINE 30 MG/ML
INJECTION, SOLUTION INTRAMUSCULAR; INTRAVENOUS
Status: DISPENSED
Start: 2022-01-26

## (undated) RX ORDER — ONDANSETRON 2 MG/ML
INJECTION INTRAMUSCULAR; INTRAVENOUS
Status: DISPENSED
Start: 2022-01-26

## (undated) RX ORDER — FLUCONAZOLE 2 MG/ML
INJECTION, SOLUTION INTRAVENOUS
Status: DISPENSED
Start: 2022-01-26

## (undated) RX ORDER — GLYCOPYRROLATE 0.2 MG/ML
INJECTION INTRAMUSCULAR; INTRAVENOUS
Status: DISPENSED
Start: 2022-01-26

## (undated) RX ORDER — ACETAMINOPHEN 325 MG/1
TABLET ORAL
Status: DISPENSED
Start: 2022-01-26

## (undated) RX ORDER — DEXAMETHASONE SODIUM PHOSPHATE 4 MG/ML
INJECTION, SOLUTION INTRA-ARTICULAR; INTRALESIONAL; INTRAMUSCULAR; INTRAVENOUS; SOFT TISSUE
Status: DISPENSED
Start: 2022-01-26

## (undated) RX ORDER — LIDOCAINE HYDROCHLORIDE 20 MG/ML
INJECTION, SOLUTION EPIDURAL; INFILTRATION; INTRACAUDAL; PERINEURAL
Status: DISPENSED
Start: 2022-01-26

## (undated) RX ORDER — FENTANYL CITRATE 50 UG/ML
INJECTION, SOLUTION INTRAMUSCULAR; INTRAVENOUS
Status: DISPENSED
Start: 2022-01-26

## (undated) RX ORDER — GABAPENTIN 300 MG/1
CAPSULE ORAL
Status: DISPENSED
Start: 2022-01-26